# Patient Record
Sex: FEMALE | Race: ASIAN | NOT HISPANIC OR LATINO
[De-identification: names, ages, dates, MRNs, and addresses within clinical notes are randomized per-mention and may not be internally consistent; named-entity substitution may affect disease eponyms.]

---

## 2020-01-01 ENCOUNTER — RESULT REVIEW (OUTPATIENT)
Age: 55
End: 2020-01-01

## 2020-12-29 ENCOUNTER — RESULT REVIEW (OUTPATIENT)
Age: 55
End: 2020-12-29

## 2021-02-16 ENCOUNTER — RESULT REVIEW (OUTPATIENT)
Age: 56
End: 2021-02-16

## 2022-07-26 ENCOUNTER — RESULT REVIEW (OUTPATIENT)
Age: 57
End: 2022-07-26

## 2022-09-13 ENCOUNTER — RESULT REVIEW (OUTPATIENT)
Age: 57
End: 2022-09-13

## 2022-10-17 ENCOUNTER — RESULT REVIEW (OUTPATIENT)
Age: 57
End: 2022-10-17

## 2023-01-23 ENCOUNTER — INPATIENT (INPATIENT)
Facility: HOSPITAL | Age: 58
LOS: 1 days | Discharge: HOME | End: 2023-01-25
Attending: INTERNAL MEDICINE | Admitting: INTERNAL MEDICINE
Payer: COMMERCIAL

## 2023-01-23 VITALS
RESPIRATION RATE: 18 BRPM | TEMPERATURE: 98 F | SYSTOLIC BLOOD PRESSURE: 187 MMHG | HEART RATE: 93 BPM | DIASTOLIC BLOOD PRESSURE: 78 MMHG | OXYGEN SATURATION: 100 %

## 2023-01-23 DIAGNOSIS — E05.90 THYROTOXICOSIS, UNSPECIFIED WITHOUT THYROTOXIC CRISIS OR STORM: ICD-10-CM

## 2023-01-23 PROCEDURE — 99285 EMERGENCY DEPT VISIT HI MDM: CPT

## 2023-01-23 NOTE — ED ADULT TRIAGE NOTE - CHIEF COMPLAINT QUOTE
Pt states she had blood work done with PCP and was told to go to an endocrinologist for her thyroid. Denies a known thyroid problem. C/o 1 month of intermittent palpitations and feeling "shaky". Denies SOB, denies chest pain. Pt states she's unable to get an appt with endocrinologist until May.

## 2023-01-23 NOTE — ED PROVIDER NOTE - OBJECTIVE STATEMENT
57-year-old female with past medical history of  hyperlipidemia and diabetes.  Patient is on metformin and Farxiga.  Patient presents to the ED with 1 month of change in voice, weight loss, tremors and intermittent palpitations.  Patient denies any chest pain or shortness of breath.  Patient was seen by PMD few days ago and had blood work which showed low TSH of less than 0.01 with T3 total 388 and free T4 5.1.  Patient attempted to get follow-up with endocrine but was only able to get appointment in May.  Patient also notes change in voice.  Patient denies any new medications but did start krill oil after symptoms started.

## 2023-01-23 NOTE — ED PROVIDER NOTE - PHYSICAL EXAMINATION
Heart rate 98.  Asymmetry of left neck with enlargement on left side with nontender nodule noted on the left thyroid./Goiter.  Mild resting tremor of hands noted

## 2023-01-23 NOTE — CHART NOTE - NSCHARTNOTEFT_GEN_A_CORE
Pt is a 56 y/o female presenting with palpitations to the ED. Also with weight loss and change in voice. Referred by PCP as outpatient TFTs suggestive of thyrotoxicosis. TSH undetectable, TT3 388, FT4 5.1 per ED attending. HR upper 90s, low 100. Has an asymmetric goiter, most prominent on left. No evidence of graves eye disease. Mental status normal and patient does not appear to be in thyroid storm per attending. Patient would be willing to stay overnight for observation or admission if no CDU beds available. Would start low dose propranolol as BP tolerates with goal HR 60-80. Would start methimazole 20mg daily, first dose now. Explain to patient risks of methimazole including agranulocytosis and hepatic dysfunction. Per ED attending, outpatient labs done this past Thursday showed normal WBC and mild aminotransferase elevations (~2x normal). Send off TSH receptor antibody (TRAb), thyroid stimulating immunoglobulin (TSI), TSH, FT4 and total T3 in the morning.    Full consult on 1/24.    Discussed with ED attending Dr. Olvera.    Tommy Carlton DO, Endocrinology Fellow  For follow-up questions, discharge recommendations, or new consults please call answering service at [phone] (weekdays), [phone] (nights/weekends). For nonurgent matters, please email [email or web address] or [email or web address].

## 2023-01-23 NOTE — ED PROVIDER NOTE - NSICDXPASTMEDICALHX_GEN_ALL_CORE_FT
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

## 2023-01-23 NOTE — ED PROVIDER NOTE - CLINICAL SUMMARY MEDICAL DECISION MAKING FREE TEXT BOX
57-year-old female with diabetes and hyperlipidemia.  Patient presents to the ED with symptoms of hyperthyroidism.  On physical exam patient is noted to have nodule or goiter on left side which is asymmetric along with symptomatic hyperthyroidism.  Case and labs discussed with endocrine consult and they recommended starting patient on propranolol which has been ordered.  We will also start methimazole once LFTs are checked.  Will order labs as requested and admit patient to medical service for observation as there are no CDU beds available at this time.  Patient may get ultrasound of the thyroid in the a.m.  Patient is a good candidate for admission given significant elevation in T3 and T4 versus outpatient follow-up

## 2023-01-24 ENCOUNTER — TRANSCRIPTION ENCOUNTER (OUTPATIENT)
Age: 58
End: 2023-01-24

## 2023-01-24 DIAGNOSIS — E78.5 HYPERLIPIDEMIA, UNSPECIFIED: ICD-10-CM

## 2023-01-24 DIAGNOSIS — E05.90 THYROTOXICOSIS, UNSPECIFIED WITHOUT THYROTOXIC CRISIS OR STORM: ICD-10-CM

## 2023-01-24 DIAGNOSIS — I10 ESSENTIAL (PRIMARY) HYPERTENSION: ICD-10-CM

## 2023-01-24 DIAGNOSIS — E11.9 TYPE 2 DIABETES MELLITUS WITHOUT COMPLICATIONS: ICD-10-CM

## 2023-01-24 DIAGNOSIS — R63.8 OTHER SYMPTOMS AND SIGNS CONCERNING FOOD AND FLUID INTAKE: ICD-10-CM

## 2023-01-24 LAB
A1C WITH ESTIMATED AVERAGE GLUCOSE RESULT: 7.4 % — HIGH (ref 4–5.6)
ALBUMIN SERPL ELPH-MCNC: 3.6 G/DL — SIGNIFICANT CHANGE UP (ref 3.3–5)
ALBUMIN SERPL ELPH-MCNC: 4.2 G/DL — SIGNIFICANT CHANGE UP (ref 3.3–5)
ALP SERPL-CCNC: 60 U/L — SIGNIFICANT CHANGE UP (ref 40–120)
ALP SERPL-CCNC: 68 U/L — SIGNIFICANT CHANGE UP (ref 40–120)
ALT FLD-CCNC: 52 U/L — HIGH (ref 4–33)
ALT FLD-CCNC: 66 U/L — HIGH (ref 4–33)
ANION GAP SERPL CALC-SCNC: 12 MMOL/L — SIGNIFICANT CHANGE UP (ref 7–14)
ANION GAP SERPL CALC-SCNC: 16 MMOL/L — HIGH (ref 7–14)
AST SERPL-CCNC: 24 U/L — SIGNIFICANT CHANGE UP (ref 4–32)
AST SERPL-CCNC: 30 U/L — SIGNIFICANT CHANGE UP (ref 4–32)
BASOPHILS # BLD AUTO: 0.02 K/UL — SIGNIFICANT CHANGE UP (ref 0–0.2)
BASOPHILS # BLD AUTO: 0.02 K/UL — SIGNIFICANT CHANGE UP (ref 0–0.2)
BASOPHILS NFR BLD AUTO: 0.1 % — SIGNIFICANT CHANGE UP (ref 0–2)
BASOPHILS NFR BLD AUTO: 0.2 % — SIGNIFICANT CHANGE UP (ref 0–2)
BILIRUB SERPL-MCNC: 0.4 MG/DL — SIGNIFICANT CHANGE UP (ref 0.2–1.2)
BILIRUB SERPL-MCNC: 0.5 MG/DL — SIGNIFICANT CHANGE UP (ref 0.2–1.2)
BUN SERPL-MCNC: 26 MG/DL — HIGH (ref 7–23)
BUN SERPL-MCNC: 27 MG/DL — HIGH (ref 7–23)
CALCIUM SERPL-MCNC: 10.1 MG/DL — SIGNIFICANT CHANGE UP (ref 8.4–10.5)
CALCIUM SERPL-MCNC: 9.7 MG/DL — SIGNIFICANT CHANGE UP (ref 8.4–10.5)
CHLORIDE SERPL-SCNC: 103 MMOL/L — SIGNIFICANT CHANGE UP (ref 98–107)
CHLORIDE SERPL-SCNC: 105 MMOL/L — SIGNIFICANT CHANGE UP (ref 98–107)
CO2 SERPL-SCNC: 22 MMOL/L — SIGNIFICANT CHANGE UP (ref 22–31)
CO2 SERPL-SCNC: 25 MMOL/L — SIGNIFICANT CHANGE UP (ref 22–31)
CREAT SERPL-MCNC: 0.45 MG/DL — LOW (ref 0.5–1.3)
CREAT SERPL-MCNC: 0.56 MG/DL — SIGNIFICANT CHANGE UP (ref 0.5–1.3)
EGFR: 107 ML/MIN/1.73M2 — SIGNIFICANT CHANGE UP
EGFR: 113 ML/MIN/1.73M2 — SIGNIFICANT CHANGE UP
EOSINOPHIL # BLD AUTO: 0.04 K/UL — SIGNIFICANT CHANGE UP (ref 0–0.5)
EOSINOPHIL # BLD AUTO: 0.06 K/UL — SIGNIFICANT CHANGE UP (ref 0–0.5)
EOSINOPHIL NFR BLD AUTO: 0.2 % — SIGNIFICANT CHANGE UP (ref 0–6)
EOSINOPHIL NFR BLD AUTO: 0.7 % — SIGNIFICANT CHANGE UP (ref 0–6)
ESTIMATED AVERAGE GLUCOSE: 166 — SIGNIFICANT CHANGE UP
GLUCOSE BLDC GLUCOMTR-MCNC: 116 MG/DL — HIGH (ref 70–99)
GLUCOSE BLDC GLUCOMTR-MCNC: 125 MG/DL — HIGH (ref 70–99)
GLUCOSE BLDC GLUCOMTR-MCNC: 134 MG/DL — HIGH (ref 70–99)
GLUCOSE BLDC GLUCOMTR-MCNC: 143 MG/DL — HIGH (ref 70–99)
GLUCOSE BLDC GLUCOMTR-MCNC: 98 MG/DL — SIGNIFICANT CHANGE UP (ref 70–99)
GLUCOSE SERPL-MCNC: 110 MG/DL — HIGH (ref 70–99)
GLUCOSE SERPL-MCNC: 179 MG/DL — HIGH (ref 70–99)
HCT VFR BLD CALC: 35.9 % — SIGNIFICANT CHANGE UP (ref 34.5–45)
HCT VFR BLD CALC: 40.8 % — SIGNIFICANT CHANGE UP (ref 34.5–45)
HGB BLD-MCNC: 11.6 G/DL — SIGNIFICANT CHANGE UP (ref 11.5–15.5)
HGB BLD-MCNC: 13.1 G/DL — SIGNIFICANT CHANGE UP (ref 11.5–15.5)
IANC: 13 K/UL — HIGH (ref 1.8–7.4)
IANC: 4.65 K/UL — SIGNIFICANT CHANGE UP (ref 1.8–7.4)
IMM GRANULOCYTES NFR BLD AUTO: 0.4 % — SIGNIFICANT CHANGE UP (ref 0–0.9)
IMM GRANULOCYTES NFR BLD AUTO: 0.4 % — SIGNIFICANT CHANGE UP (ref 0–0.9)
LYMPHOCYTES # BLD AUTO: 14.6 % — SIGNIFICANT CHANGE UP (ref 13–44)
LYMPHOCYTES # BLD AUTO: 2.38 K/UL — SIGNIFICANT CHANGE UP (ref 1–3.3)
LYMPHOCYTES # BLD AUTO: 3.74 K/UL — HIGH (ref 1–3.3)
LYMPHOCYTES # BLD AUTO: 41 % — SIGNIFICANT CHANGE UP (ref 13–44)
MAGNESIUM SERPL-MCNC: 2 MG/DL — SIGNIFICANT CHANGE UP (ref 1.6–2.6)
MCHC RBC-ENTMCNC: 27.1 PG — SIGNIFICANT CHANGE UP (ref 27–34)
MCHC RBC-ENTMCNC: 27.3 PG — SIGNIFICANT CHANGE UP (ref 27–34)
MCHC RBC-ENTMCNC: 32.1 GM/DL — SIGNIFICANT CHANGE UP (ref 32–36)
MCHC RBC-ENTMCNC: 32.3 GM/DL — SIGNIFICANT CHANGE UP (ref 32–36)
MCV RBC AUTO: 84.3 FL — SIGNIFICANT CHANGE UP (ref 80–100)
MCV RBC AUTO: 84.5 FL — SIGNIFICANT CHANGE UP (ref 80–100)
MONOCYTES # BLD AUTO: 0.61 K/UL — SIGNIFICANT CHANGE UP (ref 0–0.9)
MONOCYTES # BLD AUTO: 0.77 K/UL — SIGNIFICANT CHANGE UP (ref 0–0.9)
MONOCYTES NFR BLD AUTO: 4.7 % — SIGNIFICANT CHANGE UP (ref 2–14)
MONOCYTES NFR BLD AUTO: 6.7 % — SIGNIFICANT CHANGE UP (ref 2–14)
NEUTROPHILS # BLD AUTO: 13 K/UL — HIGH (ref 1.8–7.4)
NEUTROPHILS # BLD AUTO: 4.65 K/UL — SIGNIFICANT CHANGE UP (ref 1.8–7.4)
NEUTROPHILS NFR BLD AUTO: 51 % — SIGNIFICANT CHANGE UP (ref 43–77)
NEUTROPHILS NFR BLD AUTO: 80 % — HIGH (ref 43–77)
NRBC # BLD: 0 /100 WBCS — SIGNIFICANT CHANGE UP (ref 0–0)
NRBC # BLD: 0 /100 WBCS — SIGNIFICANT CHANGE UP (ref 0–0)
NRBC # FLD: 0 K/UL — SIGNIFICANT CHANGE UP (ref 0–0)
NRBC # FLD: 0 K/UL — SIGNIFICANT CHANGE UP (ref 0–0)
PHOSPHATE SERPL-MCNC: 5.8 MG/DL — HIGH (ref 2.5–4.5)
PLATELET # BLD AUTO: 235 K/UL — SIGNIFICANT CHANGE UP (ref 150–400)
PLATELET # BLD AUTO: 270 K/UL — SIGNIFICANT CHANGE UP (ref 150–400)
POTASSIUM SERPL-MCNC: 3.7 MMOL/L — SIGNIFICANT CHANGE UP (ref 3.5–5.3)
POTASSIUM SERPL-MCNC: 4.4 MMOL/L — SIGNIFICANT CHANGE UP (ref 3.5–5.3)
POTASSIUM SERPL-SCNC: 3.7 MMOL/L — SIGNIFICANT CHANGE UP (ref 3.5–5.3)
POTASSIUM SERPL-SCNC: 4.4 MMOL/L — SIGNIFICANT CHANGE UP (ref 3.5–5.3)
PROT SERPL-MCNC: 6.5 G/DL — SIGNIFICANT CHANGE UP (ref 6–8.3)
PROT SERPL-MCNC: 7.4 G/DL — SIGNIFICANT CHANGE UP (ref 6–8.3)
RBC # BLD: 4.25 M/UL — SIGNIFICANT CHANGE UP (ref 3.8–5.2)
RBC # BLD: 4.84 M/UL — SIGNIFICANT CHANGE UP (ref 3.8–5.2)
RBC # FLD: 12.3 % — SIGNIFICANT CHANGE UP (ref 10.3–14.5)
RBC # FLD: 12.4 % — SIGNIFICANT CHANGE UP (ref 10.3–14.5)
SARS-COV-2 RNA SPEC QL NAA+PROBE: SIGNIFICANT CHANGE UP
SODIUM SERPL-SCNC: 140 MMOL/L — SIGNIFICANT CHANGE UP (ref 135–145)
SODIUM SERPL-SCNC: 143 MMOL/L — SIGNIFICANT CHANGE UP (ref 135–145)
T3 SERPL-MCNC: 329 NG/DL — HIGH (ref 80–200)
T4 AB SER-ACNC: 18.49 UG/DL — HIGH (ref 5.1–13)
THYROGLOB AB FLD IA-ACNC: <20 IU/ML — SIGNIFICANT CHANGE UP
THYROGLOB AB SERPL-ACNC: <20 IU/ML — SIGNIFICANT CHANGE UP
THYROGLOB SERPL-MCNC: 247 NG/ML — HIGH (ref 1.6–59.9)
TSH SERPL-MCNC: <0.1 UIU/ML — LOW (ref 0.27–4.2)
TSH SERPL-MCNC: <0.1 UIU/ML — LOW (ref 0.27–4.2)
WBC # BLD: 16.27 K/UL — HIGH (ref 3.8–10.5)
WBC # BLD: 9.12 K/UL — SIGNIFICANT CHANGE UP (ref 3.8–10.5)
WBC # FLD AUTO: 16.27 K/UL — HIGH (ref 3.8–10.5)
WBC # FLD AUTO: 9.12 K/UL — SIGNIFICANT CHANGE UP (ref 3.8–10.5)

## 2023-01-24 PROCEDURE — 99255 IP/OBS CONSLTJ NEW/EST HI 80: CPT | Mod: GC

## 2023-01-24 PROCEDURE — 99223 1ST HOSP IP/OBS HIGH 75: CPT

## 2023-01-24 RX ORDER — SODIUM CHLORIDE 9 MG/ML
1000 INJECTION, SOLUTION INTRAVENOUS
Refills: 0 | Status: DISCONTINUED | OUTPATIENT
Start: 2023-01-24 | End: 2023-01-25

## 2023-01-24 RX ORDER — METFORMIN HYDROCHLORIDE 850 MG/1
2 TABLET ORAL
Qty: 120 | Refills: 0
Start: 2023-01-24 | End: 2023-02-22

## 2023-01-24 RX ORDER — METFORMIN HYDROCHLORIDE 850 MG/1
1 TABLET ORAL
Qty: 0 | Refills: 0 | DISCHARGE

## 2023-01-24 RX ORDER — ENOXAPARIN SODIUM 100 MG/ML
40 INJECTION SUBCUTANEOUS EVERY 24 HOURS
Refills: 0 | Status: DISCONTINUED | OUTPATIENT
Start: 2023-01-24 | End: 2023-01-25

## 2023-01-24 RX ORDER — ATENOLOL 25 MG/1
25 TABLET ORAL DAILY
Refills: 0 | Status: DISCONTINUED | OUTPATIENT
Start: 2023-01-24 | End: 2023-01-25

## 2023-01-24 RX ORDER — DEXTROSE 50 % IN WATER 50 %
25 SYRINGE (ML) INTRAVENOUS ONCE
Refills: 0 | Status: DISCONTINUED | OUTPATIENT
Start: 2023-01-24 | End: 2023-01-25

## 2023-01-24 RX ORDER — DEXTROSE 50 % IN WATER 50 %
12.5 SYRINGE (ML) INTRAVENOUS ONCE
Refills: 0 | Status: DISCONTINUED | OUTPATIENT
Start: 2023-01-24 | End: 2023-01-25

## 2023-01-24 RX ORDER — DEXTROSE 50 % IN WATER 50 %
15 SYRINGE (ML) INTRAVENOUS ONCE
Refills: 0 | Status: DISCONTINUED | OUTPATIENT
Start: 2023-01-24 | End: 2023-01-25

## 2023-01-24 RX ORDER — INSULIN LISPRO 100/ML
VIAL (ML) SUBCUTANEOUS AT BEDTIME
Refills: 0 | Status: DISCONTINUED | OUTPATIENT
Start: 2023-01-24 | End: 2023-01-25

## 2023-01-24 RX ORDER — GLUCAGON INJECTION, SOLUTION 0.5 MG/.1ML
1 INJECTION, SOLUTION SUBCUTANEOUS ONCE
Refills: 0 | Status: DISCONTINUED | OUTPATIENT
Start: 2023-01-24 | End: 2023-01-25

## 2023-01-24 RX ORDER — ATENOLOL 25 MG/1
1 TABLET ORAL
Qty: 30 | Refills: 0
Start: 2023-01-24 | End: 2023-02-22

## 2023-01-24 RX ORDER — ACETAMINOPHEN 500 MG
650 TABLET ORAL EVERY 6 HOURS
Refills: 0 | Status: DISCONTINUED | OUTPATIENT
Start: 2023-01-24 | End: 2023-01-25

## 2023-01-24 RX ORDER — INSULIN LISPRO 100/ML
VIAL (ML) SUBCUTANEOUS
Refills: 0 | Status: DISCONTINUED | OUTPATIENT
Start: 2023-01-24 | End: 2023-01-25

## 2023-01-24 RX ORDER — ATORVASTATIN CALCIUM 80 MG/1
40 TABLET, FILM COATED ORAL AT BEDTIME
Refills: 0 | Status: DISCONTINUED | OUTPATIENT
Start: 2023-01-24 | End: 2023-01-25

## 2023-01-24 RX ORDER — METHIMAZOLE 10 MG/1
2 TABLET ORAL
Qty: 60 | Refills: 0
Start: 2023-01-24 | End: 2023-02-22

## 2023-01-24 RX ADMIN — ATORVASTATIN CALCIUM 40 MILLIGRAM(S): 80 TABLET, FILM COATED ORAL at 22:37

## 2023-01-24 RX ADMIN — ATENOLOL 25 MILLIGRAM(S): 25 TABLET ORAL at 19:50

## 2023-01-24 NOTE — ED ADULT NURSE NOTE - OBJECTIVE STATEMENT
Pt is A&O x 4, ambulatory w/o assistance, shows no signs of acute distress. Sent to the ED for for abnormal labs. Pt states for 2 months she has been experiencing intermittent palpitations and a generalized "shaky" feeling. Pt also endorses a lump on the left side of her throat and a 20 pound unintentional weight loss over the last 2 months. Currently denies gi/gu discomfort, SOB, or chest pain. Respirations even and unlabored. Left forearm 20 gauge placed and labs drawn. Medications administered. Pending results.

## 2023-01-24 NOTE — CONSULT NOTE ADULT - SUBJECTIVE AND OBJECTIVE BOX
HPI:  Patient is a 57 year old F hx of HLD, DM2 , and recently diagnosed hyperthyroidism who presents for follow up. She has had 1 month weight loss and intermittent palpitations. She denies chest pain or sob. She was seen by PCP that showed low TSH 0.01, T3 388, free t4 5.1. ED course HR 90s, given propranolol total 20 mg. Endocrine was consulted in ED.     ED vitals: afebrile, HR 90s, /50, RR 18 98% RA (2023 00:58)      Consulted for: thyrotoxicosis  This is a 58 yo F /w a PMH of DM2 and HLD who presents to ED after PCP recommended evaluation for palpitations when her lab work showed evidence of hyperthyroidism  Per patient she noticed over the last 3 months heat intolerance, palpitations, diaphoresis, tremors in her hands, and a 20 pound unintentional weight loss. Denies any hyperdefecation, vision changes. Reports swelling in the neck and reports feeling a nodule in her left thyroid. Denies dysphonia, dysphagia, or shortness of breath. Denies any history of lithium, amiodarone, or biotin use. Denies a family history of hypothyroidism, hyperthyroidism, or thyroid cancer    Patient reports DM2 for 15 years. Checks her FSG in the morning and ranges around 130. HbA1c 7.4%. Takes metformin 500mg twice per day only. She is agreeable to increase the dose to 1000mg twice per day    Endocrine on call paged last night and started patient on metihmazole 20mg daily and propranolol 20mg daily.    PAST MEDICAL & SURGICAL HISTORY:  h/o uterine leiomyoma      ovarian Cyst - left ovary      hyperlipidemia      Eye Abnormality--right lazy eye--intermittently deviates medially      Hepatitis A---&#x27;from food&#x27; -- 1985      Eye Abnormality--left eye muscle doesn&#x27;t accommodate to the left      High blood sugar  high blood sugar borderline DM per pt never on meds for blood sugar       Delivery       H/O: myomectomy in 2011      High blood sugar          FAMILY HISTORY:  No significant family history (Father)        Social History: denies all toxic habits    Home Medications:  Farxiga 5 mg oral tablet: 1 tab(s) orally once a day (2023 01:42)  Lipitor 40 mg oral tablet: 1 tab(s) orally once a day (2023 01:42)  metFORMIN 500 mg oral tablet: 1 tab(s) orally 2 times a day (2023 01:42)      MEDICATIONS  (STANDING):  atorvastatin 40 milliGRAM(s) Oral at bedtime  dextrose 5%. 1000 milliLiter(s) (100 mL/Hr) IV Continuous <Continuous>  dextrose 5%. 1000 milliLiter(s) (50 mL/Hr) IV Continuous <Continuous>  dextrose 50% Injectable 25 Gram(s) IV Push once  dextrose 50% Injectable 12.5 Gram(s) IV Push once  dextrose 50% Injectable 25 Gram(s) IV Push once  enoxaparin Injectable 40 milliGRAM(s) SubCutaneous every 24 hours  glucagon  Injectable 1 milliGRAM(s) IntraMuscular once  insulin lispro (ADMELOG) corrective regimen sliding scale   SubCutaneous three times a day before meals  insulin lispro (ADMELOG) corrective regimen sliding scale   SubCutaneous at bedtime  methimazole 20 milliGRAM(s) Oral every 24 hours  propranolol 20 milliGRAM(s) Oral every 24 hours    MEDICATIONS  (PRN):  acetaminophen     Tablet .. 650 milliGRAM(s) Oral every 6 hours PRN Temp greater or equal to 38C (100.4F), Mild Pain (1 - 3)  dextrose Oral Gel 15 Gram(s) Oral once PRN Blood Glucose LESS THAN 70 milliGRAM(s)/deciliter      Allergies    bactrim/sulfamethoxazole---pruritc rash with edema (Other)  sulfa drugs (Unknown)  trimethoprim (Unknown)    Intolerances      Review of Systems:  Constitutional: No fever  Eyes: No blurry vision  Neuro: No tremors  HEENT: No pain  Cardiovascular: No chest pain, palpitations  Respiratory: No SOB, no cough  GI: No nausea, vomiting, abdominal pain  : No dysuria  Skin: no rash  Psych: no depression  Endocrine: no polyuria, polydipsia  Hem/lymph: no swelling  Osteoporosis: no fractures    PHYSICAL EXAM:  VITALS: T(C): 36.8 (23 @ 10:05)  T(F): 98.3 (23 @ 10:05), Max: 98.5 (23 @ 19:20)  HR: 63 (23 @ 10:05) (63 - 98)  BP: 101/50 (23 @ 10:05) (101/50 - 187/78)  RR:  (17 - 19)  SpO2:  (97% - 100%)  Wt(kg): --  GENERAL: NAD  EYES: No proptosis, no lid lag, anicteric  THYROID: there is a large nodule palpated on the left thyroid ~2cm in size. There is a smaller nodule palpated on the right thyroid (unable to assess size)  RESPIRATORY: Clear to auscultation bilaterally  CARDIOVASCULAR: Regular rate and rhythm  GI: Soft, nontender, non distended  EXT: b/l feet without wounds; 2+ pulses  PSYCH: Alert and oriented x 3, reactive mood    POCT Blood Glucose.: 125 mg/dL (23 @ 12:55)  POCT Blood Glucose.: 98 mg/dL (23 @ 08:55)  POCT Blood Glucose.: 134 mg/dL (23 @ 02:28)                            11.6   9.12  )-----------( 235      ( 2023 06:20 )             35.9           143  |  105  |  26<H>  ----------------------------<  110<H>  3.7   |  22  |  0.45<L>    eGFR: 113    Ca    9.7        Mg     2.00       Phos  5.8         TPro  6.5  /  Alb  3.6  /  TBili  0.4  /  DBili  x   /  AST  24  /  ALT  52<H>  /  AlkPhos  60        Thyroid Function Tests:   @ 23:39 TSH <0.10 FreeT4 -- T3 329 Anti TPO -- Anti Thyroglobulin Ab -- TSI --      A1C with Estimated Average Glucose Result: 7.4 % (23 @ 23:39)          Radiology:                HPI:  Patient is a 57 year old F hx of HLD, DM2 , and recently diagnosed hyperthyroidism who presents for management. She has had 1 month weight loss and intermittent palpitations. She denies chest pain or sob. She was seen by PCP that showed low TSH 0.01, T3 388, free t4 5.1. ED course HR 90s, given propranolol total 20 mg. Endocrine was consulted in ED.     ED vitals: afebrile, HR 90s, /50, RR 18 98% RA (2023 00:58)      Consulted for: thyrotoxicosis  This is a 56 yo F /w a PMH of DM2 and HLD who presents to ED after PCP recommended evaluation for palpitations when her lab work showed evidence of hyperthyroidism  Per patient she noticed over the last 3 months heat intolerance, palpitations, diaphoresis, tremors in her hands, and a 20 pound unintentional weight loss. Denies any hyperdefecation, vision changes. Reports swelling in the neck and reports feeling a nodule in her left thyroid. Denies dysphonia, dysphagia, or shortness of breath. Denies any history of lithium, amiodarone, or biotin use. Denies a family history of hypothyroidism, hyperthyroidism, or thyroid cancer    Patient reports DM2 for 15 years. Checks her FSG in the morning and ranges around 130. HbA1c 7.4%. Takes metformin 500mg twice per day only. She is agreeable to increase the dose to 1000mg twice per day    Endocrine on call paged last night and started patient on metihmazole 20mg daily and propranolol 20mg daily.    PAST MEDICAL & SURGICAL HISTORY:  h/o uterine leiomyoma      ovarian Cyst - left ovary      hyperlipidemia      Eye Abnormality--right lazy eye--intermittently deviates medially      Hepatitis A---&#x27;from food&#x27; -- 1985      Eye Abnormality--left eye muscle doesn&#x27;t accommodate to the left      High blood sugar  high blood sugar borderline DM per pt never on meds for blood sugar       Delivery       H/O: myomectomy in 2011      High blood sugar          FAMILY HISTORY:  No significant family history (Father)        Social History: denies all toxic habits    Home Medications:  Farxiga 5 mg oral tablet: 1 tab(s) orally once a day (2023 01:42)  Lipitor 40 mg oral tablet: 1 tab(s) orally once a day (2023 01:42)  metFORMIN 500 mg oral tablet: 1 tab(s) orally 2 times a day (2023 01:42)      MEDICATIONS  (STANDING):  atorvastatin 40 milliGRAM(s) Oral at bedtime  dextrose 5%. 1000 milliLiter(s) (100 mL/Hr) IV Continuous <Continuous>  dextrose 5%. 1000 milliLiter(s) (50 mL/Hr) IV Continuous <Continuous>  dextrose 50% Injectable 25 Gram(s) IV Push once  dextrose 50% Injectable 12.5 Gram(s) IV Push once  dextrose 50% Injectable 25 Gram(s) IV Push once  enoxaparin Injectable 40 milliGRAM(s) SubCutaneous every 24 hours  glucagon  Injectable 1 milliGRAM(s) IntraMuscular once  insulin lispro (ADMELOG) corrective regimen sliding scale   SubCutaneous three times a day before meals  insulin lispro (ADMELOG) corrective regimen sliding scale   SubCutaneous at bedtime  methimazole 20 milliGRAM(s) Oral every 24 hours  propranolol 20 milliGRAM(s) Oral every 24 hours    MEDICATIONS  (PRN):  acetaminophen     Tablet .. 650 milliGRAM(s) Oral every 6 hours PRN Temp greater or equal to 38C (100.4F), Mild Pain (1 - 3)  dextrose Oral Gel 15 Gram(s) Oral once PRN Blood Glucose LESS THAN 70 milliGRAM(s)/deciliter      Allergies    bactrim/sulfamethoxazole---pruritc rash with edema (Other)  sulfa drugs (Unknown)  trimethoprim (Unknown)    Intolerances      Review of Systems:  Constitutional: No fever  Eyes: No blurry vision  Neuro: No tremors  HEENT: No pain  Cardiovascular: No chest pain, palpitations  Respiratory: No SOB, no cough  GI: No nausea, vomiting, abdominal pain  : No dysuria  Skin: no rash  Psych: no depression  Endocrine: no polyuria, polydipsia  Hem/lymph: no swelling  Osteoporosis: no fractures    PHYSICAL EXAM:  VITALS: T(C): 36.8 (23 @ 10:05)  T(F): 98.3 (23 @ 10:05), Max: 98.5 (23 @ 19:20)  HR: 63 (23 @ 10:05) (63 - 98)  BP: 101/50 (23 @ 10:05) (101/50 - 187/78)  RR:  (17 - 19)  SpO2:  (97% - 100%)  Wt(kg): --  GENERAL: NAD  EYES: No proptosis, no lid lag, anicteric  THYROID: there is a large nodule palpated on the left thyroid ~2cm in size. There is a smaller nodule palpated on the right thyroid (unable to assess size)  RESPIRATORY: Clear to auscultation bilaterally  CARDIOVASCULAR: Regular rate and rhythm  GI: Soft, nontender, non distended  EXT: b/l feet without wounds; 2+ pulses  PSYCH: Alert and oriented x 3, reactive mood    POCT Blood Glucose.: 125 mg/dL (23 @ 12:55)  POCT Blood Glucose.: 98 mg/dL (23 @ 08:55)  POCT Blood Glucose.: 134 mg/dL (23 @ 02:28)                            11.6   9.12  )-----------( 235      ( 2023 06:20 )             35.9           143  |  105  |  26<H>  ----------------------------<  110<H>  3.7   |  22  |  0.45<L>    eGFR: 113    Ca    9.7        Mg     2.00       Phos  5.8         TPro  6.5  /  Alb  3.6  /  TBili  0.4  /  DBili  x   /  AST  24  /  ALT  52<H>  /  AlkPhos  60        Thyroid Function Tests:   @ 23:39 TSH <0.10 FreeT4 -- T3 329 Anti TPO -- Anti Thyroglobulin Ab -- TSI --      A1C with Estimated Average Glucose Result: 7.4 % (23 @ 23:39)          Radiology:

## 2023-01-24 NOTE — H&P ADULT - PROBLEM SELECTOR PLAN 1
-check TRab, TSI and f/u TSH and t4/t3  -methimazole 20 mg daily  -propranolol 20 mg and uptitrate as tolerated

## 2023-01-24 NOTE — H&P ADULT - ASSESSMENT
Patient is a 57 year old F hx of HLD, DM2 , and recently diagnosed hyperthyroidism who presents for follow up.

## 2023-01-24 NOTE — DISCHARGE NOTE PROVIDER - NSDCFUSCHEDAPPT_GEN_ALL_CORE_FT
Del Preston  [facility]  ENDOCRIN [address]  Scheduled Appointment: 04/07/2023

## 2023-01-24 NOTE — PATIENT PROFILE ADULT - FUNCTIONAL ASSESSMENT - DAILY ACTIVITY 2.
4 = No assist / stand by assistance Bactrim Counseling:  I discussed with the patient the risks of sulfa antibiotics including but not limited to GI upset, allergic reaction, drug rash, diarrhea, dizziness, photosensitivity, and yeast infections.  Rarely, more serious reactions can occur including but not limited to aplastic anemia, agranulocytosis, methemoglobinemia, blood dyscrasias, liver or kidney failure, lung infiltrates or desquamative/blistering drug rashes.

## 2023-01-24 NOTE — DISCHARGE NOTE PROVIDER - PROVIDER TOKENS
FREE:[LAST:[Endocrinology],PHONE:[(   )    -],FAX:[(   )    -]],PROVIDER:[TOKEN:[[id number]]] PROVIDER:[TOKEN:[[id number]]],FREE:[LAST:[Endocrinology],PHONE:[(   )    -],FAX:[(   )    -]],PROVIDER:[TOKEN:[[id number]]]

## 2023-01-24 NOTE — DISCHARGE NOTE PROVIDER - HOSPITAL COURSE
Hyperthyroidism.   -check TRab, TSI and f/u TSH and t4/t3- can be followed up as outpatient  -methimazole 20 mg daily  -atenolol 25mg PO daily  -outpatient Endocrine follow up  -follow up details given to patient by endocrinology    HLD (hyperlipidemia).   -c/w lipitor 40 mg    DM2 (diabetes mellitus, type 2).   -CC diet  -moderate sliding scale.  hgbA1c- 7.4  -per endo, inc dose of metformin on dc to 1000mg PO BID    Dispo- home     Hyperthyroidism.   -check TRab, TSI and f/u TSH and t4/t3- can be followed up as outpatient  -methimazole 20 mg daily  -atenolol 25mg PO daily  -outpatient Endocrine follow up  -follow up details given to patient by endocrinology    HLD (hyperlipidemia).   -c/w lipitor 40 mg    DM2 (diabetes mellitus, type 2).   -CC diet  -moderate sliding scale.  hgbA1c- 7.4  -per endo, inc dose of metformin on dc to 1000mg PO BID    Dispo- home    As discussed with Dr. Seals on 1/25/23 pt is medically stable and cleared for discharge home.

## 2023-01-24 NOTE — DISCHARGE NOTE PROVIDER - NSDCCPCAREPLAN_GEN_ALL_CORE_FT
PRINCIPAL DISCHARGE DIAGNOSIS  Diagnosis: Hyperthyroidism  Assessment and Plan of Treatment: Continue Methimazole and Atenolol as prescribed. Follow up with endocrinology as instructed by endocrine team. You were given a list of doctors for follow up in your area. Return to ED for persistent palpitations, chest pain or shortness of breath      SECONDARY DISCHARGE DIAGNOSES  Diagnosis: DM2 (diabetes mellitus, type 2)  Assessment and Plan of Treatment: Your hgbA1c is 7.4. Continue Metformin at increase dose of 1000mg 2x/day. Continue Farxiga as previously prescribed. FOllow up with endocrinology for further management.     PRINCIPAL DISCHARGE DIAGNOSIS  Diagnosis: Hyperthyroidism  Assessment and Plan of Treatment: Continue Methimazole and Atenolol as prescribed. Follow up with endocrinology as instructed by endocrine team. You were given a list of doctors for follow up in your area. Return to ED for persistent palpitations, chest pain or shortness of breath, dizziness/lightheadedness or feeling of fainting, or other new concerning symptom.  Inform your Primary Care Provider (PCP) of your admission and need for outpatient followup; have your primary care monitor you and your labwork until you can be seen by an Endocrinologist.  Your PCP can also arrange testing (ultrasound, possibly fine needle aspiration, of your thyroid nodule). Additional Endo recommendations who can hopefully see you sooner than your April appointment include:  call [phone]; There are community endocrine practices as well within [facility] you can call who may have earlier appts: Can call [phone] for Dr. Junior or his partners.      SECONDARY DISCHARGE DIAGNOSES  Diagnosis: DM2 (diabetes mellitus, type 2)  Assessment and Plan of Treatment: Your hgbA1c is 7.4. Continue Metformin at increase dose of 1000mg 2x/day. Continue Farxiga as previously prescribed. Follow up with endocrinology for further management. Your urine test today only showed high glucose (from your diabetes), no urine infection.    Eat a consistent carbohydrate diet (Meaning eating the same amount of carbohydrates at the same time each day). Monitor blood glucose levels throughout the day before meals and at bedtime. Record blood sugars and bring to outpatient providers appointment in order to be reviewed by your doctor for management modifications. If your sugars are more than 400 or less than 70 you should contact your PCP immediately.   Monitor for signs/symptoms of low blood glucose, such as, dizziness, altered mental status, or cool/clammy skin. In addition, monitor for signs/symptoms of high blood glucose, such as, feeling hot, dry, fatigued, or with increased thirst/urination.  Return to hospital if these symptoms occur/   Make regular podiatry appointments in order to have feet checked for wounds and uncontrolled toe nail growth to prevent infections, as well as, appointments with an ophthalmologist to monitor your vision.

## 2023-01-24 NOTE — CONSULT NOTE ADULT - ATTENDING COMMENTS
New onset hyperthyroidism - Graves disease vs. toxic nodule in differential.  Initiate methimazole and agree with beta blocker.  Vitals stable, can dc on medical management with close outpatient endocrine follow up for continued work up and management.  DM2 agree with plan as outlined. Complex patient high level decision making.    Gabriela Marti MD  Division of Endocrinology  Pager: [phone]    If after 6PM or before 9AM, or on weekends/holidays, please call endocrine answering service for assistance ([phone]).  For nonurgent matters email [email or web address] for assistance.

## 2023-01-24 NOTE — CONSULT NOTE ADULT - ASSESSMENT
This is a 58 yo F /w a PMH of DM2 and HLD who presents to ED with palpitations and evidence of thyrotoxicosis. Physical exam notable for HR in 70-90s and thyroid nodules palpated bilaterally L >R. Labs notable for TSH <0.01 and TT3 328, TT4 18, FT4 as outpatient 5.1. Differential diagnosis in this case is Graves' disease vs toxic multinodular goiter vs toxic adenoma. Will assess antibodies for evaluation for Graves'. Patient will likely need a thyroid US as outpatient as well as thyroid uptake and scan if antibodies are negative to evaluate for toxic multinodular goiter. Given high FT4 and signs and symptoms of hyperthyroidism, will continue methimazole and have patient follow up as an outpatient    #Hyperthyroidism   -TSI sent, can follow up as outpatient  -follow up FT4  -IF able to add on TSH receptor antibody, please do so prior to discharge. If staying overnight please send this in the morning  -unclear why IGF1 sent, no signs of acromegaly from history or physical exam  -c/w methimazole 20mg daily  reviewed possible side effects and signs and symptoms of adverse reactions including transaminitis and neutropenia  -can stop propranolol  -start atenolol 25mg daily     #D/C recs  -c/w methimazole 20mg daily  -atenolol 25mg daily  -patient should get close follow up with endocrinology. Will provide her a list of locations. She lives in [place] and wants to follow up closer to home if possible    #DM2  -switch to low correction scale with meals and before bedtime  -FSG before meals and before bedtime  #D/C  -increase metformin to 1000mg twice per day  -follow up with PCP for nephropathy, retinopathy screening  -no signs of retinopathy or neuropathy from history at this time    #HLD  -c/w lipitor 40mg daily    #HTN  -BP at goal, if elevated >130/80 consistently can consider adding on ACEi/ARB therapy    Case discussed with Dr. Estephanie Alan MD  Endocrine Fellow  Can be reached via teams. For follow up questions, discharge recommendations, or new consults, please call answering service at [phone] (weekdays); [phone] (nights/weekends)

## 2023-01-24 NOTE — H&P ADULT - NSHPLABSRESULTS_GEN_ALL_CORE
.  LABS:                         13.1   16.27 )-----------( 270      ( 23 Jan 2023 23:39 )             40.8     01-23    140  |  103  |  27<H>  ----------------------------<  179<H>  4.4   |  25  |  0.56    Ca    10.1      23 Jan 2023 23:39    TPro  7.4  /  Alb  4.2  /  TBili  0.5  /  DBili  x   /  AST  30  /  ALT  66<H>  /  AlkPhos  68  01-23                  RADIOLOGY, EKG & ADDITIONAL TESTS: Reviewed.

## 2023-01-24 NOTE — CHART NOTE - NSCHARTNOTEFT_GEN_A_CORE
57 year old F hx of HLD, DM2 , and recently diagnosed hyperthyroidism who presents for follow up. She has had 1 month weight loss and intermittent palpitations. She denies chest pain or sob. She was seen by PCP that showed low TSH 0.01, T3 388, free t4 5.1. ED course HR 90s, given propranolol total 20 mg. Endocrine was consulted in ED.       Admitted for Thyrotoxicosis   D/C Endo recs appreciated         Time spent co ordinating plan 41 minutes

## 2023-01-24 NOTE — DISCHARGE NOTE PROVIDER - CARE PROVIDERS DIRECT ADDRESSES
,DirectAddress_Unknown,DirectAddress_Unknown ,DirectAddress_Unknown,DirectAddress_Unknown,[email or web address]

## 2023-01-24 NOTE — PATIENT PROFILE ADULT - FALL HARM RISK - HARM RISK INTERVENTIONS

## 2023-01-24 NOTE — DISCHARGE NOTE PROVIDER - NSDCMRMEDTOKEN_GEN_ALL_CORE_FT
atenolol 25 mg oral tablet: 1 tab(s) orally once a day  Farxiga 5 mg oral tablet: 1 tab(s) orally once a day  Lipitor 40 mg oral tablet: 1 tab(s) orally once a day  metFORMIN 500 mg oral tablet: 2 tab(s) orally 2 times a day   methIMAzole 10 mg oral tablet: 2 tab(s) orally every 24 hours

## 2023-01-24 NOTE — DISCHARGE NOTE PROVIDER - NSFOLLOWUPCLINICS_GEN_ALL_ED_FT
[facility]  Endocrinology  [address] [place] [address]  Phone: [phone]  Fax:     [facility]  Endocrinology  [address] [place] [address]  Phone: [phone]  Fax: [phone]

## 2023-01-24 NOTE — H&P ADULT - HISTORY OF PRESENT ILLNESS
INCOMPLETE  57-year-old female with past medical history of  hyperlipidemia and diabetes.  Patient is on metformin and Farxiga.  Patient presents to the ED with 1 month of change in voice, weight loss, tremors and intermittent palpitations.  Patient denies any chest pain or shortness of breath.  Patient was seen by PMD few days ago and had blood work which showed low TSH of less than 0.01 with T3 total 388 and free T4 5.1.  Patient attempted to get follow-up with endocrine but was only able to get appointment in May.  Patient also notes change in voice.  Patient denies any new medications but did start krill oil after symptoms started. Patient is a 57 year old F hx of HLD, DM2 , and recently diagnosed hyperthyroidism who presents for follow up. She has had 1 month weight loss and intermittent palpitations. She denies chest pain or sob. She was seen by PCP that showed low TSH 0.01, T3 388, free t4 5.1. ED course HR 90s, given propranolol total 20 mg. Endocrine was consulted in ED.     ED vitals: afebrile, HR 90s, /50, RR 18 98% RA

## 2023-01-24 NOTE — DISCHARGE NOTE PROVIDER - CARE PROVIDER_API CALL
Endocrinology,   Phone: (   )    -  Fax: (   )    -  Follow Up Time:     SHELBI DAY  Internal Medicine  [address] [place] [address]  Phone: [phone]  Fax: ()-  Follow Up Time:    SHELBI DAY  Internal Medicine  [id number] [place] [address]  Phone: [phone]  Fax: ()-  Follow Up Time:     Endocrinology,   Phone: (   )    -  Fax: (   )    -  Follow Up Time:     Charles Junior (DO)  Internal Medicine  [address] [facility], [address]  [place] [address]  Phone: [phone]  Fax: [phone]  Follow Up Time:

## 2023-01-25 ENCOUNTER — TRANSCRIPTION ENCOUNTER (OUTPATIENT)
Age: 58
End: 2023-01-25

## 2023-01-25 VITALS
RESPIRATION RATE: 17 BRPM | DIASTOLIC BLOOD PRESSURE: 51 MMHG | HEART RATE: 62 BPM | TEMPERATURE: 99 F | SYSTOLIC BLOOD PRESSURE: 121 MMHG | OXYGEN SATURATION: 98 %

## 2023-01-25 PROBLEM — Z00.00 ENCOUNTER FOR PREVENTIVE HEALTH EXAMINATION: Status: ACTIVE | Noted: 2023-01-25

## 2023-01-25 LAB
ANION GAP SERPL CALC-SCNC: 8 MMOL/L — SIGNIFICANT CHANGE UP (ref 7–14)
APPEARANCE UR: CLEAR — SIGNIFICANT CHANGE UP
BACTERIA # UR AUTO: NEGATIVE — SIGNIFICANT CHANGE UP
BILIRUB UR-MCNC: NEGATIVE — SIGNIFICANT CHANGE UP
BUN SERPL-MCNC: 22 MG/DL — SIGNIFICANT CHANGE UP (ref 7–23)
CALCIUM SERPL-MCNC: 10.1 MG/DL — SIGNIFICANT CHANGE UP (ref 8.4–10.5)
CHLORIDE SERPL-SCNC: 106 MMOL/L — SIGNIFICANT CHANGE UP (ref 98–107)
CO2 SERPL-SCNC: 25 MMOL/L — SIGNIFICANT CHANGE UP (ref 22–31)
COLOR SPEC: SIGNIFICANT CHANGE UP
CREAT SERPL-MCNC: 0.41 MG/DL — LOW (ref 0.5–1.3)
DIFF PNL FLD: NEGATIVE — SIGNIFICANT CHANGE UP
EGFR: 115 ML/MIN/1.73M2 — SIGNIFICANT CHANGE UP
EPI CELLS # UR: 1 /HPF — SIGNIFICANT CHANGE UP (ref 0–5)
GLUCOSE BLDC GLUCOMTR-MCNC: 119 MG/DL — HIGH (ref 70–99)
GLUCOSE BLDC GLUCOMTR-MCNC: 130 MG/DL — HIGH (ref 70–99)
GLUCOSE BLDC GLUCOMTR-MCNC: 174 MG/DL — HIGH (ref 70–99)
GLUCOSE SERPL-MCNC: 123 MG/DL — HIGH (ref 70–99)
GLUCOSE UR QL: ABNORMAL
HCT VFR BLD CALC: 39.7 % — SIGNIFICANT CHANGE UP (ref 34.5–45)
HGB BLD-MCNC: 12.7 G/DL — SIGNIFICANT CHANGE UP (ref 11.5–15.5)
INSULIN-LIKE GROWTH FACTOR 1 INTERPRETATION: SIGNIFICANT CHANGE UP
INSULIN-LIKE GROWTH FACTOR 1: 187 NG/ML — SIGNIFICANT CHANGE UP (ref 48–235)
KETONES UR-MCNC: NEGATIVE — SIGNIFICANT CHANGE UP
LEUKOCYTE ESTERASE UR-ACNC: ABNORMAL
MAGNESIUM SERPL-MCNC: 1.9 MG/DL — SIGNIFICANT CHANGE UP (ref 1.6–2.6)
MCHC RBC-ENTMCNC: 27 PG — SIGNIFICANT CHANGE UP (ref 27–34)
MCHC RBC-ENTMCNC: 32 GM/DL — SIGNIFICANT CHANGE UP (ref 32–36)
MCV RBC AUTO: 84.3 FL — SIGNIFICANT CHANGE UP (ref 80–100)
NITRITE UR-MCNC: NEGATIVE — SIGNIFICANT CHANGE UP
NRBC # BLD: 0 /100 WBCS — SIGNIFICANT CHANGE UP (ref 0–0)
NRBC # FLD: 0 K/UL — SIGNIFICANT CHANGE UP (ref 0–0)
PH UR: 5.5 — SIGNIFICANT CHANGE UP (ref 5–8)
PHOSPHATE SERPL-MCNC: 5.5 MG/DL — HIGH (ref 2.5–4.5)
PLATELET # BLD AUTO: 225 K/UL — SIGNIFICANT CHANGE UP (ref 150–400)
POTASSIUM SERPL-MCNC: 4 MMOL/L — SIGNIFICANT CHANGE UP (ref 3.5–5.3)
POTASSIUM SERPL-SCNC: 4 MMOL/L — SIGNIFICANT CHANGE UP (ref 3.5–5.3)
PROT UR-MCNC: NEGATIVE — SIGNIFICANT CHANGE UP
RBC # BLD: 4.71 M/UL — SIGNIFICANT CHANGE UP (ref 3.8–5.2)
RBC # FLD: 12.3 % — SIGNIFICANT CHANGE UP (ref 10.3–14.5)
RBC CASTS # UR COMP ASSIST: 1 /HPF — SIGNIFICANT CHANGE UP (ref 0–4)
SODIUM SERPL-SCNC: 139 MMOL/L — SIGNIFICANT CHANGE UP (ref 135–145)
SP GR SPEC: 1.02 — SIGNIFICANT CHANGE UP (ref 1.01–1.05)
T4 FREE SERPL-MCNC: >5 NG/DL — HIGH (ref 0.9–1.8)
UROBILINOGEN FLD QL: SIGNIFICANT CHANGE UP
WBC # BLD: 5.07 K/UL — SIGNIFICANT CHANGE UP (ref 3.8–10.5)
WBC # FLD AUTO: 5.07 K/UL — SIGNIFICANT CHANGE UP (ref 3.8–10.5)
WBC UR QL: 4 /HPF — SIGNIFICANT CHANGE UP (ref 0–5)

## 2023-01-25 RX ADMIN — Medication 2: at 12:47

## 2023-01-25 NOTE — DISCHARGE NOTE NURSING/CASE MANAGEMENT/SOCIAL WORK - NSDCPEFALRISK_GEN_ALL_CORE
For information on Fall & Injury Prevention, visit: [email or web address] OR  [email or web address] OR  https://www.cdc.gov/steadi/patient.html

## 2023-01-25 NOTE — DISCHARGE NOTE NURSING/CASE MANAGEMENT/SOCIAL WORK - NSDPDISTO_GEN_ALL_CORE
Verified Results  HEMOGLOBIN A1C GLYCOSYLATED 09Mar2017 12:01AM [facility]   [Mar 10, 2017 12:44PM [facility]]  Diabetes well controlled; continue current medications     Test Name Result Flag Reference   HEMOGLOBIN A1C GLYH 6.7 % H 4.5-5.6   ----DIABETIC SCREENING---  NON DIABETIC                 <5.7%  INCREASED RISK                5.7-6.4%  DIAGNOSTIC FOR DIABETES      >6.4%     ----DIABETIC CONTROL---     A1C%           eAG mg/dL  6.0            126  6.5            140  7.0            154  7.5            169  8.0            183  8.5            197  9.0            212  9.5            226  10.0           240     MICROALBUMIN, URINE 09Mar2017 12:01AM [facility]   [Mar 10, 2017 12:44PM [facility]]  Diabetes well controlled; continue current medications     Test Name Result Flag Reference   MICROALBUMIN 41.10 mg/dl     CREATININE RANDOM URINE 68.30 mg/dl     MICROALB/CREAT RATIO 601.8 mcg/mg H <30   Consistent with clinical albuminuria.        Home

## 2023-01-25 NOTE — DISCHARGE NOTE NURSING/CASE MANAGEMENT/SOCIAL WORK - PATIENT PORTAL LINK FT
You can access the FollowMyHealth Patient Portal offered by [facility] by registering at the following website: [email or web address]. By joining [facility]’s FollowMyHealth portal, you will also be able to view your health information using other applications (apps) compatible with our system.

## 2023-01-26 LAB — TSI ACT/NOR SER: 2.67 IU/L — HIGH (ref 0–0.55)

## 2023-02-01 ENCOUNTER — TRANSCRIPTION ENCOUNTER (OUTPATIENT)
Age: 58
End: 2023-02-01

## 2023-02-01 ENCOUNTER — APPOINTMENT (OUTPATIENT)
Dept: ENDOCRINOLOGY | Facility: CLINIC | Age: 58
End: 2023-02-01
Payer: COMMERCIAL

## 2023-02-01 DIAGNOSIS — U07.1 COVID-19: ICD-10-CM

## 2023-02-01 DIAGNOSIS — Z86.39 PERSONAL HISTORY OF OTHER ENDOCRINE, NUTRITIONAL AND METABOLIC DISEASE: ICD-10-CM

## 2023-02-01 DIAGNOSIS — Z86.018 PERSONAL HISTORY OF OTHER BENIGN NEOPLASM: ICD-10-CM

## 2023-02-01 DIAGNOSIS — Z78.9 OTHER SPECIFIED HEALTH STATUS: ICD-10-CM

## 2023-02-01 PROCEDURE — 99205 OFFICE O/P NEW HI 60 MIN: CPT | Mod: 95

## 2023-02-01 PROCEDURE — 99215 OFFICE O/P EST HI 40 MIN: CPT | Mod: 95

## 2023-02-01 NOTE — ADDENDUM
[FreeTextEntry1] :  70 minutes spent the time noted on the day of this patient encounter preparing for, reviewing records, providing and documenting the above E/M service and 50% of time spent face to face counseling and education provided to patient on disease course, and treatment/management. All questions and concerns were answered and addressed in detail.

## 2023-02-01 NOTE — REASON FOR VISIT
[Home] : at home, [unfilled] , at the time of the visit. [Medical Office: ([facility])___] : at the medical office located in  [Patient] : the patient [Self] : self [Initial Evaluation] : an initial evaluation [Hyperthyroidism] : hyperthyroidism

## 2023-02-01 NOTE — ASSESSMENT
[FreeTextEntry1] : \par - TSI: 2.67\par - Most recent TFTs ishows FT4 is high 5.1\par - Currently on methimazole  20 mg daily and atenolol 25 mg daily\par - Her sympathetic symptoms are not well controlled still\par Increase atenolol to 50 mg daily\par - Check the lab work as below on 8th Feb 2023\par - Discussed definitive therapy with [facility] vs. surgery, Given patient has Goiter with mild compressive symptoms will recommend to total thyroidectomy rather radioactive iodine or continued methimazole treatment.  \par She wants to continue methimazole for now\par \par Plan:\par - Check TSH, free T4, total T3, and TSI\par - Check CBC and LFTs\par - Continue methimazole 20 mg daily, may adjust depending on results of above\par - Counseled on side effects of methimazole, patient to call clinic if develops sore throat, fever, abdominal pain, or jaundice\par Discussed to follow up with ophthalmologist to rule out Graves eye disease. \par Will do thyroid ultrasound with color flow doppler ultrasonography to evaluate for nodule and vascularity respectively. \par - Return to clinic as in person visit in 4- 6 weeks

## 2023-02-01 NOTE — REVIEW OF SYSTEMS
[Fatigue] : fatigue [Recent Weight Loss (___ Lbs)] : recent weight loss: [unfilled] lbs [Dysphagia] : dysphagia [Dysphonia] : dysphonia [Palpitations] : palpitations [Fast Heart Rate] : fast heart rate [Nausea] : nausea [Constipation] : constipation [Muscle Weakness] : muscle weakness [Myalgia] : myalgia  [Joint Stiffness] : joint stiffness [Muscle Cramps] : muscle cramps [Headaches] : headaches [Tremors] : tremors [Insomnia] : insomnia [Anxiety] : anxiety [Neck Pain] : no neck pain [Chest Pain] : no chest pain [Lower Ext Edema] : no lower extremity edema [Shortness Of Breath] : no shortness of breath [Cough] : no cough [SOB on Exertion] : no shortness of breath on exertion [Vomiting] : no vomiting [Dizziness] : no dizziness [Pain/Numbness of Digits] : no pain/numbness of digits [Heat Intolerance] : no heat intolerance [FreeTextEntry3] : No proptosis, No gritty sensation,

## 2023-02-01 NOTE — HISTORY OF PRESENT ILLNESS
[FreeTextEntry1] : 57-year-old Female with PMH of HLD, T2DM, hepatitis A, uterine leiomyoma s/p hysterectomy with ovarian removal, Hyperthyroidism, hyperlipidemia, COVID infection is scheduled for tele visit appointment for evaluation of hyperthyroidism.\par \par Patient reports that two months ago she developed neck swelling, hands shaking and tremors, voice hoarseness, intermittent palpitations, 22 lb weight loss.  PCP did the Blood work in September 2022 and TFTs were mildly abnormal. Therefore recommended to see endocrinologist. She could not find the appointment with endocrinologist, therefore she recently admitted to [facility] for hyperthyroidism. Labs were remarkable for TSH <0.01, Total T3: 338 and Free T4: 5.1. TSI: 2.67, thyroglobulin: 247.0. She was discharged on Methimazole 20 mg daily and atenolol 25 mg daily on 25th Jan 2023.  \par \par She monitors /73\par  heart rate:  75, 63, 68,69\par Current meds: metformin, farxiga, atorvastatin, low dose aspirin, methimazole 20 mg daily and atenolol 25 mg daily\par Denies use of amiodarone and hx of radiation\par No family hx hyperthyroidism

## 2023-02-12 LAB
ALBUMIN SERPL ELPH-MCNC: 4.1 G/DL
ALP BLD-CCNC: 87 U/L
ALT SERPL-CCNC: 53 U/L
ANION GAP SERPL CALC-SCNC: 19 MMOL/L
AST SERPL-CCNC: 28 U/L
BASOPHILS # BLD AUTO: 0.02 K/UL
BASOPHILS NFR BLD AUTO: 0.3 %
BILIRUB SERPL-MCNC: 0.4 MG/DL
BUN SERPL-MCNC: 17 MG/DL
CALCIUM SERPL-MCNC: 9.3 MG/DL
CHLORIDE SERPL-SCNC: 102 MMOL/L
CO2 SERPL-SCNC: 21 MMOL/L
CREAT SERPL-MCNC: 0.51 MG/DL
EGFR: 109 ML/MIN/1.73M2
EOSINOPHIL # BLD AUTO: 0.06 K/UL
EOSINOPHIL NFR BLD AUTO: 0.9 %
GLUCOSE SERPL-MCNC: 190 MG/DL
HCT VFR BLD CALC: 43.4 %
HGB BLD-MCNC: 13.7 G/DL
IMM GRANULOCYTES NFR BLD AUTO: 0.1 %
LYMPHOCYTES # BLD AUTO: 2.43 K/UL
LYMPHOCYTES NFR BLD AUTO: 34.7 %
MAN DIFF?: NORMAL
MCHC RBC-ENTMCNC: 27.8 PG
MCHC RBC-ENTMCNC: 31.6 GM/DL
MCV RBC AUTO: 88 FL
MONOCYTES # BLD AUTO: 0.41 K/UL
MONOCYTES NFR BLD AUTO: 5.9 %
NEUTROPHILS # BLD AUTO: 4.07 K/UL
NEUTROPHILS NFR BLD AUTO: 58.1 %
PLATELET # BLD AUTO: 267 K/UL
POTASSIUM SERPL-SCNC: 4.5 MMOL/L
PROT SERPL-MCNC: 6.6 G/DL
RBC # BLD: 4.93 M/UL
RBC # FLD: 13.1 %
SODIUM SERPL-SCNC: 141 MMOL/L
T3 SERPL-MCNC: 205 NG/DL
T4 FREE SERPL-MCNC: 2.2 NG/DL
TSH SERPL-ACNC: <0.01 UIU/ML
WBC # FLD AUTO: 7 K/UL

## 2023-02-13 ENCOUNTER — APPOINTMENT (OUTPATIENT)
Dept: ENDOCRINOLOGY | Facility: CLINIC | Age: 58
End: 2023-02-13

## 2023-02-14 LAB — TSI ACT/NOR SER: 2.46 IU/L

## 2023-02-19 ENCOUNTER — RX RENEWAL (OUTPATIENT)
Age: 58
End: 2023-02-19

## 2023-03-08 ENCOUNTER — APPOINTMENT (OUTPATIENT)
Dept: ENDOCRINOLOGY | Facility: CLINIC | Age: 58
End: 2023-03-08
Payer: COMMERCIAL

## 2023-03-08 VITALS
OXYGEN SATURATION: 98 % | HEART RATE: 53 BPM | TEMPERATURE: 98.6 F | RESPIRATION RATE: 16 BRPM | DIASTOLIC BLOOD PRESSURE: 65 MMHG | WEIGHT: 128 LBS | SYSTOLIC BLOOD PRESSURE: 108 MMHG

## 2023-03-08 VITALS — HEIGHT: 67 IN | BODY MASS INDEX: 20.05 KG/M2

## 2023-03-08 LAB
GLUCOSE BLDC GLUCOMTR-MCNC: 128
HBA1C MFR BLD HPLC: 7.1

## 2023-03-08 PROCEDURE — 82962 GLUCOSE BLOOD TEST: CPT

## 2023-03-08 PROCEDURE — 83036 HEMOGLOBIN GLYCOSYLATED A1C: CPT | Mod: QW

## 2023-03-08 PROCEDURE — 99215 OFFICE O/P EST HI 40 MIN: CPT | Mod: 25

## 2023-03-08 NOTE — PHYSICAL EXAM
[Alert] : alert [No Acute Distress] : no acute distress [Normal Sclera/Conjunctiva] : normal sclera/conjunctiva [No Lid Lag] : no lid lag [No LAD] : no lymphadenopathy [Supple] : the neck was supple [Normal Rate and Effort] : normal respiratory rate and effort [No Respiratory Distress] : no respiratory distress [Clear to Auscultation] : lungs were clear to auscultation bilaterally [Normal S1, S2] : normal S1 and S2 [No Murmurs] : no murmurs [Normal Rate] : heart rate was normal [Regular Rhythm] : with a regular rhythm [No Rubs] : no pericardial rub [Not Tender] : non-tender [Soft] : abdomen soft [No HSM] : no hepato-splenomegaly [No Rash] : no rash [No Skin Lesions] : no skin lesions [Cranial Nerves Intact] : cranial nerves 2-12 were intact [No Motor Deficits] : the motor exam was normal [No Sensory Deficits] : the sensory exam was normal to light touch and pinprick [Normal Reflexes] : deep tendon reflexes were 2+ and symmetric [No Tremors] : no tremors [Oriented x3] : oriented to person, place, and time [Abdominal Striae] : no abdominal striae [de-identified] : Impaired Left eye muscle movement, Mild Proptosis [de-identified] : Left-sided thyroid nodule palpable smooth in margins

## 2023-03-08 NOTE — ADDENDUM
[FreeTextEntry1] : 54  minutes spent the time noted on the day of this patient encounter preparing for, reviewing records, providing and documenting the above E/M service and 50% of time spent face to face counseling and education provided to patient on disease course, and treatment/management. All questions and concerns were answered and addressed in detail.

## 2023-03-08 NOTE — HISTORY OF PRESENT ILLNESS
[FreeTextEntry1] : 57-year-old Female with PMH of HLD, T2DM, hepatitis A, uterine leiomyoma s/p hysterectomy with ovarian removal due to ovarian cyst and fibroids, Duane's syndrome of left eye, Hyperthyroidism, hyperlipidemia, COVID infection is scheduled for follow up.\par \par Hyperthyroidism Hx:\par Two months ago, patient developed neck swelling, tremors, voice hoarseness, intermittent palpitations, 22 lb weight loss. PCP did the Blood work in September 2022 and TFTs were mildly abnormal. Then she was admitted to [facility] for hyperthyroidism. Labs were remarkable for TSH <0.01, Total T3: 338 and Free T4: 5.1. TSI: 2.67, thyroglobulin: 247.0. She was discharged on Methimazole 20 mg daily and atenolol 25 mg daily on 25th Jan 2023. \par \par On last visit and in the interim, methimazole was reduced to 10 mg daily because of the generalized rash and improving free T4 levels. Patient has also seen an opthalmologist Dr. Chris Veliz and was told that her thyroid eye disease is stable\par \par Today patient reports that her hypothyroid and symptoms have improved. \par Most recent lab test shows Lab results; March 6, 2023\par TSH:< 0.01\par Free T4: 1.3\par Total T3: 144\par CBC negative for an agranulocytosis.\par Liver function tests are also normal\par \par Thyroid Ultrasound\par Patient had a thyroid ultrasound on February 23.  The thyroid ultrasound was significant for 4 nodules\par Nodule 1 is in the mid right lobe which is 2.5 x 2 cm x 1.8 cm, isoechoic, wider than tall and smooth with no calcification.\par Nodule 2 is located at the left lobe anteriorly which is 1.8 x 0.9 x 1.5 cm solid hypoechoic wider than tall with smooth margins.\par Nodule 3 is located on the upper pole of the left lobe measuring 1.4 x 1.6 x 1.6 cm.  It is solid hyperechoic wider than tall and his mood borders.\par Nodule 4 is located on the left lower pole measuring 2.1 x 2.2 x 2.4 cm.  It is solid hypoechoic wider than tall with macrocalcification.\par \par Denies use of amiodarone and hx of radiation\par No family hx hyperthyroidism\par \par Diabetes:\par Patient's diabetes is better controlled.  She takes metformin and Farxiga.

## 2023-03-08 NOTE — ASSESSMENT
[FreeTextEntry1] : Thyrotoxicosis secondary to Graves' disease:\par Patient's symptoms has improved.  Clinical exam is negative for tremors.\par Vital signs are remarkable for heart rate of 56.\par Most recent free T4 has been normalized.  TSH is still suppressed.  \par Decrease methimazole to 5 mg daily.\par Advised to stop the atenolol as heart rate is decreased.  Counseled that to check the pulse at home.  If her heart rate is greater than 90, she can take half a dose of atenolol 25 mg.\par \par Multiple thyroid nodules:\par Patient has multiple thyroid nodules that are meeting FNA biopsy criteria.  As the TSI positive and patient has Graves' disease, it is unlikely that these nodules are hot nodules. therefore we will proceed with FNA biopsy first to evaluate for thyroid cancer.\par Referral to surgeon Dr. Calderon for repeat thyroid ultrasound and FNA biopsy.\par Discussed at length the FNA biopsy procedure with the patient.\par \par Repeat TFTs in 8 weeks\par \par Type 2 diabetes:\par A1c 7.1 and fingerstick in the office is at goal as well\par Continue metformin 1000 mg twice daily and Farxiga 5 mg daily.\par \par RTC in May 2023\par \par

## 2023-03-08 NOTE — REVIEW OF SYSTEMS
[Fatigue] : fatigue [Nausea] : nausea [Constipation] : constipation [Polyuria] : polyuria [Eye Pain] : no pain [Blurred Vision] : no blurred vision [Eyes Itch] : no itch [Dysphagia] : no dysphagia [Neck Pain] : no neck pain [Dysphonia] : no dysphonia [Chest Pain] : no chest pain [Palpitations] : no palpitations [Lower Ext Edema] : no lower extremity edema [Shortness Of Breath] : no shortness of breath [Cough] : no cough [Vomiting] : no vomiting [Hair Loss] : no hair loss [Headaches] : no headaches [Tremors] : no tremors [Insomnia] : no insomnia [Heat Intolerance] : no heat intolerance [Hot Flashes] : no hot flashes [FreeTextEntry2] : Normal Appetite, Weight stable

## 2023-03-10 LAB
ALBUMIN SERPL ELPH-MCNC: 4.2 G/DL
ALP BLD-CCNC: 115 U/L
ALT SERPL-CCNC: 27 U/L
ANION GAP SERPL CALC-SCNC: 12 MMOL/L
AST SERPL-CCNC: 19 U/L
BASOPHILS # BLD AUTO: 0.03 K/UL
BASOPHILS NFR BLD AUTO: 0.4 %
BILIRUB SERPL-MCNC: 0.2 MG/DL
BUN SERPL-MCNC: 16 MG/DL
CALCIUM SERPL-MCNC: 9.4 MG/DL
CHLORIDE SERPL-SCNC: 105 MMOL/L
CO2 SERPL-SCNC: 25 MMOL/L
CREAT SERPL-MCNC: 0.51 MG/DL
EGFR: 109 ML/MIN/1.73M2
EOSINOPHIL # BLD AUTO: 0.05 K/UL
EOSINOPHIL NFR BLD AUTO: 0.7 %
GLUCOSE SERPL-MCNC: 120 MG/DL
HCT VFR BLD CALC: 41.6 %
HGB BLD-MCNC: 13 G/DL
IMM GRANULOCYTES NFR BLD AUTO: 0.4 %
LYMPHOCYTES # BLD AUTO: 2.39 K/UL
LYMPHOCYTES NFR BLD AUTO: 34.9 %
MAN DIFF?: NORMAL
MCHC RBC-ENTMCNC: 27.3 PG
MCHC RBC-ENTMCNC: 31.3 GM/DL
MCV RBC AUTO: 87.4 FL
MONOCYTES # BLD AUTO: 0.45 K/UL
MONOCYTES NFR BLD AUTO: 6.6 %
NEUTROPHILS # BLD AUTO: 3.9 K/UL
NEUTROPHILS NFR BLD AUTO: 57 %
PLATELET # BLD AUTO: 242 K/UL
POTASSIUM SERPL-SCNC: 4.5 MMOL/L
PROT SERPL-MCNC: 6.6 G/DL
RBC # BLD: 4.76 M/UL
RBC # FLD: 13.6 %
SODIUM SERPL-SCNC: 142 MMOL/L
T3 SERPL-MCNC: 144 NG/DL
T4 FREE SERPL-MCNC: 1.3 NG/DL
TSH SERPL-ACNC: <0.01 UIU/ML
WBC # FLD AUTO: 6.85 K/UL

## 2023-04-07 ENCOUNTER — APPOINTMENT (OUTPATIENT)
Dept: ENDOCRINOLOGY | Facility: CLINIC | Age: 58
End: 2023-04-07

## 2023-04-19 ENCOUNTER — APPOINTMENT (OUTPATIENT)
Dept: SURGERY | Facility: CLINIC | Age: 58
End: 2023-04-19
Payer: COMMERCIAL

## 2023-04-19 VITALS
HEIGHT: 64 IN | WEIGHT: 127 LBS | DIASTOLIC BLOOD PRESSURE: 80 MMHG | TEMPERATURE: 97 F | OXYGEN SATURATION: 99 % | BODY MASS INDEX: 21.68 KG/M2 | SYSTOLIC BLOOD PRESSURE: 122 MMHG | HEART RATE: 70 BPM

## 2023-04-19 DIAGNOSIS — Z83.3 FAMILY HISTORY OF DIABETES MELLITUS: ICD-10-CM

## 2023-04-19 DIAGNOSIS — Z56.0 UNEMPLOYMENT, UNSPECIFIED: ICD-10-CM

## 2023-04-19 PROCEDURE — 10006 FNA BX W/US GDN EA ADDL: CPT

## 2023-04-19 PROCEDURE — 10005 FNA BX W/US GDN 1ST LES: CPT

## 2023-04-19 RX ORDER — METFORMIN HYDROCHLORIDE 625 MG/1
TABLET ORAL
Refills: 0 | Status: ACTIVE | COMMUNITY

## 2023-04-19 RX ORDER — ATORVASTATIN CALCIUM 20 MG/1
20 TABLET, FILM COATED ORAL
Refills: 0 | Status: ACTIVE | COMMUNITY

## 2023-04-19 SDOH — ECONOMIC STABILITY - INCOME SECURITY: UNEMPLOYMENT, UNSPECIFIED: Z56.0

## 2023-04-21 LAB — FNA, THYROID: NORMAL

## 2023-05-11 ENCOUNTER — APPOINTMENT (OUTPATIENT)
Dept: ENDOCRINOLOGY | Facility: CLINIC | Age: 58
End: 2023-05-11
Payer: COMMERCIAL

## 2023-05-11 VITALS
HEIGHT: 64 IN | WEIGHT: 124 LBS | BODY MASS INDEX: 21.17 KG/M2 | SYSTOLIC BLOOD PRESSURE: 147 MMHG | DIASTOLIC BLOOD PRESSURE: 78 MMHG | OXYGEN SATURATION: 98 % | HEART RATE: 73 BPM | TEMPERATURE: 97.8 F | RESPIRATION RATE: 16 BRPM

## 2023-05-11 DIAGNOSIS — E11.9 TYPE 2 DIABETES MELLITUS W/OUT COMPLICATIONS: ICD-10-CM

## 2023-05-11 DIAGNOSIS — G25.2 OTHER SPECIFIED FORMS OF TREMOR: ICD-10-CM

## 2023-05-11 LAB
ALBUMIN SERPL ELPH-MCNC: 4.4 G/DL
ALP BLD-CCNC: 89 U/L
ALT SERPL-CCNC: 39 U/L
ANION GAP SERPL CALC-SCNC: 15 MMOL/L
AST SERPL-CCNC: 24 U/L
BASOPHILS # BLD AUTO: 0.02 K/UL
BASOPHILS NFR BLD AUTO: 0.3 %
BILIRUB SERPL-MCNC: 0.3 MG/DL
BUN SERPL-MCNC: 17 MG/DL
CALCIUM SERPL-MCNC: 10.2 MG/DL
CHLORIDE SERPL-SCNC: 104 MMOL/L
CO2 SERPL-SCNC: 21 MMOL/L
CREAT SERPL-MCNC: 0.48 MG/DL
EGFR: 110 ML/MIN/1.73M2
EOSINOPHIL # BLD AUTO: 0.04 K/UL
EOSINOPHIL NFR BLD AUTO: 0.5 %
GLUCOSE SERPL-MCNC: 109 MG/DL
HCT VFR BLD CALC: 43.4 %
HGB BLD-MCNC: 13.9 G/DL
IMM GRANULOCYTES NFR BLD AUTO: 0.3 %
LYMPHOCYTES # BLD AUTO: 2.63 K/UL
LYMPHOCYTES NFR BLD AUTO: 35.1 %
MAN DIFF?: NORMAL
MCHC RBC-ENTMCNC: 27.6 PG
MCHC RBC-ENTMCNC: 32 GM/DL
MCV RBC AUTO: 86.3 FL
MONOCYTES # BLD AUTO: 0.5 K/UL
MONOCYTES NFR BLD AUTO: 6.7 %
NEUTROPHILS # BLD AUTO: 4.28 K/UL
NEUTROPHILS NFR BLD AUTO: 57.1 %
PLATELET # BLD AUTO: 298 K/UL
POTASSIUM SERPL-SCNC: 4.7 MMOL/L
PROT SERPL-MCNC: 7 G/DL
RBC # BLD: 5.03 M/UL
RBC # FLD: 13.6 %
SODIUM SERPL-SCNC: 141 MMOL/L
T3 SERPL-MCNC: 232 NG/DL
T4 FREE SERPL-MCNC: 2.7 NG/DL
TSH SERPL-ACNC: <0.01 UIU/ML
WBC # FLD AUTO: 7.49 K/UL

## 2023-05-11 PROCEDURE — 99214 OFFICE O/P EST MOD 30 MIN: CPT

## 2023-05-11 RX ORDER — ATENOLOL 25 MG/1
25 TABLET ORAL DAILY
Qty: 30 | Refills: 1 | Status: DISCONTINUED | COMMUNITY
Start: 2023-02-12 | End: 2023-05-11

## 2023-05-11 RX ORDER — METHIMAZOLE 10 MG/1
10 TABLET ORAL DAILY
Qty: 90 | Refills: 0 | Status: DISCONTINUED | COMMUNITY
Start: 2023-05-11 | End: 2023-05-11

## 2023-05-11 NOTE — PHYSICAL EXAM
[Alert] : alert [No Acute Distress] : no acute distress [Normal Sclera/Conjunctiva] : normal sclera/conjunctiva [No Lid Lag] : no lid lag [No LAD] : no lymphadenopathy [Supple] : the neck was supple [No Respiratory Distress] : no respiratory distress [Normal Rate and Effort] : normal respiratory rate and effort [Clear to Auscultation] : lungs were clear to auscultation bilaterally [Normal S1, S2] : normal S1 and S2 [No Murmurs] : no murmurs [Regular Rhythm] : with a regular rhythm [No Rubs] : no pericardial rub [Not Tender] : non-tender [Soft] : abdomen soft [No HSM] : no hepato-splenomegaly [No Rash] : no rash [No Skin Lesions] : no skin lesions [Cranial Nerves Intact] : cranial nerves 2-12 were intact [No Motor Deficits] : the motor exam was normal [No Sensory Deficits] : the sensory exam was normal to light touch and pinprick [Normal Reflexes] : deep tendon reflexes were 2+ and symmetric [Oriented x3] : oriented to person, place, and time [Abdominal Striae] : no abdominal striae [de-identified] : Impaired Left eye muscle movement, Mild Proptosis [de-identified] : Left-sided thyroid nodule palpable smooth in margins [de-identified] : Mild tachycardia [de-identified] : Tremors b/l in hands

## 2023-05-11 NOTE — HISTORY OF PRESENT ILLNESS
[FreeTextEntry1] : 57-year-old Female with PMH of HLD, T2DM, hepatitis A, uterine leiomyoma s/p hysterectomy with ovarian removal due to ovarian cyst and fibroids, Duane's syndrome of left eye, Hyperthyroidism, hyperlipidemia, COVID infection is scheduled for follow up.\par \par Hyperthyroidism Hx:\par Two months ago, patient developed neck swelling, tremors, voice hoarseness, intermittent palpitations, 22 lb weight loss. PCP did the Blood work in September 2022 and TFTs were mildly abnormal. Then she was admitted to [facility] for hyperthyroidism. Labs were remarkable for TSH <0.01, Total T3: 338 and Free T4: 5.1. TSI: 2.67, thyroglobulin: 247.0. She was discharged on Methimazole 20 mg daily and atenolol 25 mg daily on 25th Jan 2023. \par \par On last visit and in the interim, methimazole was reduced to 10 mg daily because of the generalized rash and improving free T4 levels. Patient has also seen an opthalmologist Dr. Chris Veliz and was told that her thyroid eye disease is stable\par Patient has a recent thyroid blood test on second May, 2023 that showed worsening free T4 of 2.7 ng/dL, and TSH of less than 0.01\par Liver function tests are also normal, \par Patient is currently taking methimazole 5 mg daily.\par \par Patient also had a thyroid ultrasound that showed\par Thyroid Ultrasound\par Patient had a thyroid ultrasound on February 23. The thyroid ultrasound was significant for 4 nodules\par Nodule 1 is in the mid right lobe which is 2.5 x 2 cm x 1.8 cm, isoechoic, wider than tall and smooth with no calcification.\par Nodule 2 is located at the left lobe anteriorly which is 1.8 x 0.9 x 1.5 cm solid hypoechoic wider than tall with smooth margins.\par Nodule 3 is located on the upper pole of the left lobe measuring 1.4 x 1.6 x 1.6 cm. It is solid hyperechoic wider than tall and his mood borders.\par Nodule 4 is located on the left lower pole measuring 2.1 x 2.2 x 2.4 cm. 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

## 2023-05-11 NOTE — REVIEW OF SYSTEMS
[Fatigue] : fatigue [Recent Weight Loss (___ Lbs)] : recent weight loss: [unfilled] lbs [Palpitations] : palpitations [Fast Heart Rate] : fast heart rate [Constipation] : constipation [Polyuria] : polyuria [Joint Pain] : joint pain [Muscle Weakness] : muscle weakness [Myalgia] : myalgia  [Muscle Cramps] : muscle cramps [Tremors] : tremors [Insomnia] : insomnia [Heat Intolerance] : heat intolerance [Eye Pain] : no pain [Eyes Itch] : no itch [Redness] : no redness  [Dysphagia] : no dysphagia [Neck Pain] : no neck pain [Dysphonia] : no dysphonia [Chest Pain] : no chest pain [Lower Ext Edema] : no lower extremity edema [Shortness Of Breath] : no shortness of breath [Cough] : no cough [SOB on Exertion] : no shortness of breath on exertion [Nausea] : no nausea [Abdominal Pain] : no abdominal pain [Vomiting] : no vomiting [Irregular Menses] : regular menses [Dry Skin] : no dry skin [Headaches] : no headaches [Anxiety] : no anxiety [FreeTextEntry5] : Soemtimes

## 2023-05-11 NOTE — ASSESSMENT
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 However Free T4 has worsened from the past\par  Clinical exam shows coarse tremors\par Vital signs are remarkable for heart rate of 73.\par Increase methimazole to 10 mg daily\par Start propranolol 10 mg daily.\par \par Multiple thyroid nodules:\par Patient has multiple thyroid nodules status post FNA biopsy\par We will discuss with  regarding whether the molecular test has been sent for the nodule with the undetermined significance. \par Will likely observe the nodule at this time, given patient still is hyperthyroid. Will follow up with thyroid ultrasound w/wo repeat FNA biopsy\par \par Repeat TFTs on 15th June 2023\par \par \par RTC in June 2023

## 2023-05-11 NOTE — ADDENDUM
[FreeTextEntry1] : 30  minutes spent the time noted on the day of this patient encounter preparing for, reviewing records, providing and documenting the above E/M service and 50% of time spent face to face counseling and education provided to patient on disease course, and treatment/management. All questions and concerns were answered and addressed in detail.

## 2023-06-16 NOTE — PHYSICAL EXAM
[No Rash or Lesion] : No rash or lesion [Alert] : alert [Oriented to Person] : oriented to person [Oriented to Place] : oriented to place [Oriented to Time] : oriented to time [Calm] : calm [Normal Breath Sounds] : Normal breath sounds [Normal Heart Sounds] : normal heart sounds [Normal Rate and Rhythm] : normal rate and rhythm [de-identified] : The patient is alert, well-groomed  [de-identified] : KIRSTIN [de-identified] : Neck Trachea midline, multiple palpable thyroid nodules, no palpable lymphadenopathy, no thyroid bruit.  [de-identified] : Soft NT/ND [de-identified] : full range of motion and no deformities appreciated.

## 2023-06-16 NOTE — PLAN
[FreeTextEntry1] : PREOPERATIVE DIAGNOSIS:  Right and left thyroid Nodule\par POSTOPERATIVE DIAGNOSIS: Pending pathology\par Nodules: Right Lobe posterior heterogeneous nodule 2.2 cm x 1.8 x 1.7 cm with question microcalcifications  \par Left lobe dominant nodule heterogeneous with large course classification, 2.5 x 2.1 cm \par \par PROCEDURE:  Informed consent was obtained. All the options, benefits and risks of the procedure discussed. A time out was performed and the patient was prepped and draped in sterile fashion. The patient was anesthesized with Lidocaine with epinephrine. Using ultrasound guidance and a 25 gauge needle, 4 passes were made into each nodule. The samples were prepped- smears were made and cytolyt rinses obtained.\par Patient tolerated the procedure fine without any apparent complication.

## 2023-06-16 NOTE — HISTORY OF PRESENT ILLNESS
[de-identified] : JAYSON PANTOJA is a 58 year old female with PMHx of of HLD, T2DM, hepatitis A, uterine leiomyoma s/p hysterectomy with ovarian removal due to ovarian cyst and fibroids, Duane's syndrome of left eye, Hyperthyroidism, hyperlipidemia who present in the office for initial consultation. Patient was referred by MD Limon with chief complaint of  hyperthyroidism and thyroid nodules. The graves was diagnosed in January 2023 and the patient is currently taking methimazole.  The patient denies any compressive like symptoms. On questioning the patient denied dysphagia, SOB, cough and voice changes. The patient denies any history of radiation or family history of thyroid problems or cancer. The patient is not a andrews.\par \par Patient had a thyroid ultrasound on 02/03/2023., result were c/w with 4 nodules\par Nodule 1 is in the mid right lobe which is 2.5 x 2 cm x 1.8 cm, isoechoic, wider than tall and smooth with no calcification.\par Nodule 2 is located at the left lobe anteriorly which is 1.8 x 0.9 x 1.5 cm solid hypoechoic wider than tall with smooth margins.\par Nodule 3 is located on the upper pole of the left lobe measuring 1.4 x 1.6 x 1.6 cm. It is solid hyperechoic wider than tall and his mood borders.\par Nodule 4 is located on the left lower pole measuring 2.1 x 2.2 x 2.4 cm. It is solid hypoechoic wider than tall with macrocalcification.

## 2023-06-16 NOTE — CONSULT LETTER
[Dear  ___] : Dear  [unfilled], [Consult Letter:] : I had the pleasure of evaluating your patient, [unfilled]. [Consult Closing:] : Thank you very much for allowing me to participate in the care of this patient.  If you have any questions, please do not hesitate to contact me. [Sincerely,] : Sincerely, [FreeTextEntry3] : Joceline Calderon MD FACS

## 2023-06-16 NOTE — ASSESSMENT
[FreeTextEntry1] : JAYSON PANTOJA is a 58 year old female presents with Graves hyperthyroidism.\par \par Thyroid Ultrasound \par \par Diffuse heterogeneity of both lobes \par \par Right Lobe posterior heterogenous nodule measuring 2.2 cm x 1.8 x 1.7 cm with question microcalcifications \par \par  \par Left lobe dominant nodule heterogenous with large course classification measuring 2.5 x 2.1 cm \par \par \par No Suspicious lymph nodes seen in right or left lateral neck \par \par \par We recommend FNA of dominant Right  and Left Thyroid nodules,  based on in-office ultrasound. She agrees to proceed.

## 2023-06-16 NOTE — DATA REVIEWED
[FreeTextEntry1] : Exam requested by:\par NYA JENKINS MD\par [address] [facility]\par [place] [facility] [id number]\par SITE PERFORMED: [place]\par SITE PHONE: [phone]\par Patient: JAYSON PANTOJA\par YOB: 1965\par Phone: [phone]\par MRN: [id number] Acc: [id number]\par Date of Exam: 02-\par  \par EXAM:  ULTRASOUND THYROID NECK\par \par HISTORY:  Nodular goiter \par \par TECHNIQUE:  Real time sonographic imaging of the anterior neck is performed to evaluate the thyroid gland. High frequency linear array transducer is utilized to obtain grayscale and color Doppler images. Static images are provided for review. \par \par COMPARISON:  None\par \par FINDINGS:\par \par The thyroid isthmus thickness is 0.3 cm.\par \par The right lobe measures 4.7 x 2.2 x 2.1 cm in sagittal x AP x transverse dimensions.  \par The left lobe measures 5.4 x 2.8 x 2.6 cm in sagittal x AP x transverse dimensions. \par \par Overall thyroid echotexture and blood flow: There is a normal thyroid echotexture and blood flow.\par \par Thyroid nodules: We assessed up to the four most suspicious nodules as per ACR recommendations. We use the following TI-RADS lexicon:\par \par *Composition: Cystic, almost cystic, spongiform, mixed, almost solid, solid\par *Echotexture: Anechoic, isoechoic or hyperechoic, hypoechoic, very hypoechoic \par *Shape: Wider than tall, taller than wide\par *Margins: Smooth, ill-defined, lobulated or irregular, extrathyroidal extension\par *Calcification: Macrocalcifications, peripheral rim calcification, punctate foci\par \par Nodule #1:\par Location: Mid right lobe\par Size: 2.5 x 2.0 x 1.8 cm\par Morphology: Mixed, isoechoic, wider than tall, smooth, no calcification \par TI-RADS 2\par \par Nodule #2\par Location: Mid left lobe anteriorly\par Size: 1.8 x 0.9 x 1.5 cm\par Morphology: Solid, hypoechoic, wider than tall, smooth \par TI-RADS 4\par \par Nodule #3:\par Location: Upper pole left\par Size: 1.4 x 1.6 x 1.6 cm\par Morphology: Solid, hyperechoic, wider than tall, smooth \par TI-RADS 3\par \par Nodule #4:\par Location: Lower pole left\par Size: 2.1 x 2.2 x 2.4 cm\par Morphology: Solid, hypoechoic, wider than tall, smooth, macrocalcification \par TI-RADS 4\par \par There are no suspicious cervical lymph nodes.\par \par IMPRESSION:\par \par Nodule #4 is an FNA candidate.\par \par Thank you for the opportunity to participate in the care of this patient.  \par  \par Antonio Mosher MD  - Electronically Signed: 02- 2:40 PM \par Physician to Physician Direct Line is: [phone]\par

## 2023-06-23 ENCOUNTER — APPOINTMENT (OUTPATIENT)
Dept: ENDOCRINOLOGY | Facility: CLINIC | Age: 58
End: 2023-06-23
Payer: COMMERCIAL

## 2023-06-23 LAB
ALBUMIN SERPL ELPH-MCNC: 4.4 G/DL
ALP BLD-CCNC: 118 U/L
ALT SERPL-CCNC: 22 U/L
ANION GAP SERPL CALC-SCNC: 18 MMOL/L
AST SERPL-CCNC: 15 U/L
BILIRUB SERPL-MCNC: 0.3 MG/DL
BUN SERPL-MCNC: 18 MG/DL
CALCIUM SERPL-MCNC: 10.1 MG/DL
CHLORIDE SERPL-SCNC: 103 MMOL/L
CO2 SERPL-SCNC: 21 MMOL/L
CREAT SERPL-MCNC: 0.5 MG/DL
EGFR: 109 ML/MIN/1.73M2
GLUCOSE SERPL-MCNC: 195 MG/DL
POTASSIUM SERPL-SCNC: 4.3 MMOL/L
PROT SERPL-MCNC: 7 G/DL
SODIUM SERPL-SCNC: 142 MMOL/L
T3 SERPL-MCNC: 192 NG/DL
T4 FREE SERPL-MCNC: 2.3 NG/DL
TSH SERPL-ACNC: <0.01 UIU/ML

## 2023-06-23 PROCEDURE — 99441: CPT

## 2023-07-28 ENCOUNTER — APPOINTMENT (OUTPATIENT)
Dept: ENDOCRINOLOGY | Facility: CLINIC | Age: 58
End: 2023-07-28
Payer: COMMERCIAL

## 2023-07-28 LAB
ALBUMIN SERPL ELPH-MCNC: 4.6 G/DL
ALP BLD-CCNC: 127 U/L
ALT SERPL-CCNC: 17 U/L
ANION GAP SERPL CALC-SCNC: 16 MMOL/L
AST SERPL-CCNC: 16 U/L
BILIRUB SERPL-MCNC: 0.4 MG/DL
BUN SERPL-MCNC: 16 MG/DL
CALCIUM SERPL-MCNC: 10.1 MG/DL
CHLORIDE SERPL-SCNC: 102 MMOL/L
CO2 SERPL-SCNC: 22 MMOL/L
CREAT SERPL-MCNC: 0.54 MG/DL
EGFR: 107 ML/MIN/1.73M2
GLUCOSE SERPL-MCNC: 117 MG/DL
POTASSIUM SERPL-SCNC: 4.7 MMOL/L
PROT SERPL-MCNC: 7.4 G/DL
SODIUM SERPL-SCNC: 140 MMOL/L
T3 SERPL-MCNC: 188 NG/DL
T4 FREE SERPL-MCNC: 2.1 NG/DL
TSH SERPL-ACNC: <0.01 UIU/ML

## 2023-07-28 PROCEDURE — 99441: CPT

## 2023-11-13 LAB
T3 SERPL-MCNC: 120 NG/DL
T4 FREE SERPL-MCNC: 1.6 NG/DL
TSH SERPL-ACNC: <0.01 UIU/ML

## 2023-12-21 ENCOUNTER — APPOINTMENT (OUTPATIENT)
Dept: OTOLARYNGOLOGY | Facility: CLINIC | Age: 58
End: 2023-12-21
Payer: COMMERCIAL

## 2023-12-21 VITALS
HEART RATE: 65 BPM | DIASTOLIC BLOOD PRESSURE: 84 MMHG | SYSTOLIC BLOOD PRESSURE: 143 MMHG | HEIGHT: 64 IN | WEIGHT: 127 LBS | BODY MASS INDEX: 21.68 KG/M2

## 2023-12-21 DIAGNOSIS — D23.30 OTHER BENIGN NEOPLASM OF SKIN OF UNSPECIFIED PART OF FACE: ICD-10-CM

## 2023-12-21 PROCEDURE — 99203 OFFICE O/P NEW LOW 30 MIN: CPT

## 2023-12-21 NOTE — ASSESSMENT
[FreeTextEntry1] :  Cyst - we discussed an MRI to eval depth of the cyst as I feel the lesion is deep and related to the muscle - she will need to go to an open MRI [facility] - r/b/a discussed of removal, will refer to Oculoplastics depending on results of MRI  f/u tbd by results

## 2023-12-21 NOTE — END OF VISIT
[FreeTextEntry3] : I personally saw and examined JAYSON PANTOJA in detail.  I spoke to ESTRELLA Barton regarding the assessment and plan of care. I performed the procedures and relevant physical exam.  I have reviewed the above assessment and plan of care and I agree.  I have made changes to the body of the note wherever necessary and appropriate.

## 2023-12-21 NOTE — CONSULT LETTER
[Dear  ___] : Dear  [unfilled], [Consult Letter:] : I had the pleasure of evaluating your patient, [unfilled]. [Sincerely,] : Sincerely, [FreeTextEntry3] : Geetha El MD Otolaryngology- Facial Plastics  [address] [place] [id number] (P) - [phone] (F) - [phone]

## 2023-12-21 NOTE — HISTORY OF PRESENT ILLNESS
[de-identified] : Ms. PANTOJA is a 58 year female here for eval of nodule lateral to R eye, she first noticed this 3 months ago, no pain or drainage and has not changed in size seen by Derm and was told to f/u with facial plastics

## 2023-12-21 NOTE — PHYSICAL EXAM
[de-identified] : 5mm subcutaneous nodule which is firm and feels tethered to the muscle, distinct from overlying skin.  Slightly mobile

## 2024-01-21 ENCOUNTER — RX RENEWAL (OUTPATIENT)
Age: 59
End: 2024-01-21

## 2024-01-24 DIAGNOSIS — L72.0 EPIDERMAL CYST: ICD-10-CM

## 2024-03-08 ENCOUNTER — OUTPATIENT (OUTPATIENT)
Dept: OUTPATIENT SERVICES | Facility: HOSPITAL | Age: 59
LOS: 1 days | End: 2024-03-08
Payer: COMMERCIAL

## 2024-03-08 VITALS
DIASTOLIC BLOOD PRESSURE: 82 MMHG | HEART RATE: 63 BPM | OXYGEN SATURATION: 98 % | HEIGHT: 64.57 IN | SYSTOLIC BLOOD PRESSURE: 140 MMHG | TEMPERATURE: 99 F | RESPIRATION RATE: 20 BRPM | WEIGHT: 131.4 LBS

## 2024-03-08 DIAGNOSIS — E11.9 TYPE 2 DIABETES MELLITUS WITHOUT COMPLICATIONS: ICD-10-CM

## 2024-03-08 DIAGNOSIS — Z90.710 ACQUIRED ABSENCE OF BOTH CERVIX AND UTERUS: Chronic | ICD-10-CM

## 2024-03-08 DIAGNOSIS — L72.0 EPIDERMAL CYST: ICD-10-CM

## 2024-03-08 DIAGNOSIS — Z01.818 ENCOUNTER FOR OTHER PREPROCEDURAL EXAMINATION: ICD-10-CM

## 2024-03-08 DIAGNOSIS — Z86.39 PERSONAL HISTORY OF OTHER ENDOCRINE, NUTRITIONAL AND METABOLIC DISEASE: ICD-10-CM

## 2024-03-08 DIAGNOSIS — D31.61 BENIGN NEOPLASM OF UNSPECIFIED SITE OF RIGHT ORBIT: ICD-10-CM

## 2024-03-08 LAB
A1C WITH ESTIMATED AVERAGE GLUCOSE RESULT: 7.7 % — HIGH (ref 4–5.6)
ANION GAP SERPL CALC-SCNC: 15 MMOL/L — SIGNIFICANT CHANGE UP (ref 5–17)
BUN SERPL-MCNC: 21 MG/DL — SIGNIFICANT CHANGE UP (ref 7–23)
CALCIUM SERPL-MCNC: 10.1 MG/DL — SIGNIFICANT CHANGE UP (ref 8.4–10.5)
CHLORIDE SERPL-SCNC: 100 MMOL/L — SIGNIFICANT CHANGE UP (ref 96–108)
CO2 SERPL-SCNC: 25 MMOL/L — SIGNIFICANT CHANGE UP (ref 22–31)
CREAT SERPL-MCNC: 0.47 MG/DL — LOW (ref 0.5–1.3)
EGFR: 110 ML/MIN/1.73M2 — SIGNIFICANT CHANGE UP
ESTIMATED AVERAGE GLUCOSE: 174 MG/DL — HIGH (ref 68–114)
GLUCOSE SERPL-MCNC: 164 MG/DL — HIGH (ref 70–99)
HCT VFR BLD CALC: 43.6 % — SIGNIFICANT CHANGE UP (ref 34.5–45)
HGB BLD-MCNC: 14.8 G/DL — SIGNIFICANT CHANGE UP (ref 11.5–15.5)
MCHC RBC-ENTMCNC: 28.2 PG — SIGNIFICANT CHANGE UP (ref 27–34)
MCHC RBC-ENTMCNC: 33.9 GM/DL — SIGNIFICANT CHANGE UP (ref 32–36)
MCV RBC AUTO: 83.2 FL — SIGNIFICANT CHANGE UP (ref 80–100)
NRBC # BLD: 0 /100 WBCS — SIGNIFICANT CHANGE UP (ref 0–0)
PLATELET # BLD AUTO: 282 K/UL — SIGNIFICANT CHANGE UP (ref 150–400)
POTASSIUM SERPL-MCNC: 4 MMOL/L — SIGNIFICANT CHANGE UP (ref 3.5–5.3)
POTASSIUM SERPL-SCNC: 4 MMOL/L — SIGNIFICANT CHANGE UP (ref 3.5–5.3)
RBC # BLD: 5.24 M/UL — HIGH (ref 3.8–5.2)
RBC # FLD: 12.5 % — SIGNIFICANT CHANGE UP (ref 10.3–14.5)
SODIUM SERPL-SCNC: 140 MMOL/L — SIGNIFICANT CHANGE UP (ref 135–145)
WBC # BLD: 8.09 K/UL — SIGNIFICANT CHANGE UP (ref 3.8–10.5)
WBC # FLD AUTO: 8.09 K/UL — SIGNIFICANT CHANGE UP (ref 3.8–10.5)

## 2024-03-08 PROCEDURE — 80048 BASIC METABOLIC PNL TOTAL CA: CPT

## 2024-03-08 PROCEDURE — 85027 COMPLETE CBC AUTOMATED: CPT

## 2024-03-08 PROCEDURE — G0463: CPT

## 2024-03-08 PROCEDURE — 83036 HEMOGLOBIN GLYCOSYLATED A1C: CPT

## 2024-03-08 RX ORDER — ATORVASTATIN CALCIUM 80 MG/1
1 TABLET, FILM COATED ORAL
Qty: 0 | Refills: 0 | DISCHARGE

## 2024-03-08 RX ORDER — SODIUM CHLORIDE 9 MG/ML
1000 INJECTION, SOLUTION INTRAVENOUS
Refills: 0 | Status: DISCONTINUED | OUTPATIENT
Start: 2024-03-29 | End: 2024-04-12

## 2024-03-08 RX ORDER — DAPAGLIFLOZIN 10 MG/1
1 TABLET, FILM COATED ORAL
Qty: 0 | Refills: 0 | DISCHARGE

## 2024-03-08 NOTE — H&P PST ADULT - NSANTHOSAYNRD_GEN_A_CORE
No. NIKHIL screening performed.  STOP BANG Legend: 0-2 = LOW Risk; 3-4 = INTERMEDIATE Risk; 5-8 = HIGH Risk

## 2024-03-08 NOTE — H&P PST ADULT - PROBLEM SELECTOR PLAN 2
Check fingerstick DOS  Instructed to stop Metformin day of surgery as per protocol  Instructed to take last dose of Farxiga on 3/24/2024.

## 2024-03-08 NOTE — H&P PST ADULT - ASSESSMENT
Airway:  normal    Mallampati-       Dental: Patient denies loose teeth    Activity : ADL, Walk 15 mts daily   dasi mets : 7 dasi mets    Airway:  normal    Mallampati-    3   Dental: Patient denies loose teeth    Activity : ADL, Walk 15 mts daily   dasi mets : 7 dasi mets

## 2024-03-08 NOTE — H&P PST ADULT - HISTORY OF PRESENT ILLNESS
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 Mri revealed superficial palpable abnormality overlying the right orbital rim . Now coming in for Excision 5MM Right orbital rim cyst on 3/29/2024.    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 Mri revealed superficial palpable abnormality overlying the right orbital rim . Now coming in for Excision 5MM Right orbital rim cyst on 3/29/2024.

## 2024-03-08 NOTE — H&P PST ADULT - NSICDXPASTMEDICALHX_GEN_ALL_CORE_FT
PAST MEDICAL HISTORY:  Dermoid cyst of right orbit     Duane's syndrome, left eye     H/O hyperthyroidism     Hepatitis A---'from food' -- 1985     Hyperlipidemia     ovarian Cyst - left ovary     Palpitations     Thyrotoxicosis due to Graves' disease     Type 2 diabetes mellitus      PAST MEDICAL HISTORY:  Dermoid cyst of right orbit     Duane's syndrome, left eye     H/O hyperthyroidism     Hepatitis A---'from food' -- 1985     History of uterine fibroid     Hyperlipidemia     ovarian Cyst - left ovary     Palpitations     Thyrotoxicosis due to Graves' disease     Type 2 diabetes mellitus

## 2024-03-22 ENCOUNTER — NON-APPOINTMENT (OUTPATIENT)
Age: 59
End: 2024-03-22

## 2024-03-28 ENCOUNTER — TRANSCRIPTION ENCOUNTER (OUTPATIENT)
Age: 59
End: 2024-03-28

## 2024-03-29 ENCOUNTER — TRANSCRIPTION ENCOUNTER (OUTPATIENT)
Age: 59
End: 2024-03-29

## 2024-03-29 ENCOUNTER — RESULT REVIEW (OUTPATIENT)
Age: 59
End: 2024-03-29

## 2024-03-29 ENCOUNTER — OUTPATIENT (OUTPATIENT)
Dept: OUTPATIENT SERVICES | Facility: HOSPITAL | Age: 59
LOS: 1 days | End: 2024-03-29
Payer: COMMERCIAL

## 2024-03-29 ENCOUNTER — APPOINTMENT (OUTPATIENT)
Dept: OTOLARYNGOLOGY | Facility: HOSPITAL | Age: 59
End: 2024-03-29

## 2024-03-29 VITALS
HEART RATE: 52 BPM | RESPIRATION RATE: 15 BRPM | SYSTOLIC BLOOD PRESSURE: 105 MMHG | OXYGEN SATURATION: 86 % | DIASTOLIC BLOOD PRESSURE: 49 MMHG

## 2024-03-29 VITALS
SYSTOLIC BLOOD PRESSURE: 140 MMHG | WEIGHT: 131.4 LBS | RESPIRATION RATE: 20 BRPM | DIASTOLIC BLOOD PRESSURE: 82 MMHG | TEMPERATURE: 99 F | OXYGEN SATURATION: 98 % | HEIGHT: 64 IN | HEART RATE: 63 BPM

## 2024-03-29 DIAGNOSIS — L72.0 EPIDERMAL CYST: ICD-10-CM

## 2024-03-29 DIAGNOSIS — Z90.710 ACQUIRED ABSENCE OF BOTH CERVIX AND UTERUS: Chronic | ICD-10-CM

## 2024-03-29 PROCEDURE — 88342 IMHCHEM/IMCYTCHM 1ST ANTB: CPT | Mod: 26,59

## 2024-03-29 PROCEDURE — 88341 IMHCHEM/IMCYTCHM EA ADD ANTB: CPT

## 2024-03-29 PROCEDURE — 88360 TUMOR IMMUNOHISTOCHEM/MANUAL: CPT

## 2024-03-29 PROCEDURE — 88341 IMHCHEM/IMCYTCHM EA ADD ANTB: CPT | Mod: 26,59

## 2024-03-29 PROCEDURE — ZZZZZ: CPT

## 2024-03-29 PROCEDURE — 11441 EXC FACE-MM B9+MARG 0.6-1 CM: CPT

## 2024-03-29 PROCEDURE — 67412 EXPLORE/TREAT EYE SOCKET: CPT | Mod: RT

## 2024-03-29 PROCEDURE — 88304 TISSUE EXAM BY PATHOLOGIST: CPT | Mod: 26

## 2024-03-29 PROCEDURE — 88304 TISSUE EXAM BY PATHOLOGIST: CPT

## 2024-03-29 PROCEDURE — 82962 GLUCOSE BLOOD TEST: CPT

## 2024-03-29 PROCEDURE — 12051 INTMD RPR FACE/MM 2.5 CM/<: CPT

## 2024-03-29 PROCEDURE — 88342 IMHCHEM/IMCYTCHM 1ST ANTB: CPT

## 2024-03-29 RX ORDER — METHIMAZOLE 10 MG/1
1 TABLET ORAL
Refills: 0 | DISCHARGE

## 2024-03-29 RX ORDER — ONDANSETRON 8 MG/1
4 TABLET, FILM COATED ORAL ONCE
Refills: 0 | Status: DISCONTINUED | OUTPATIENT
Start: 2024-03-29 | End: 2024-04-12

## 2024-03-29 RX ORDER — LIDOCAINE HCL 20 MG/ML
0.2 VIAL (ML) INJECTION ONCE
Refills: 0 | Status: COMPLETED | OUTPATIENT
Start: 2024-03-29 | End: 2024-03-29

## 2024-03-29 RX ORDER — HYDROMORPHONE HYDROCHLORIDE 2 MG/ML
0.5 INJECTION INTRAMUSCULAR; INTRAVENOUS; SUBCUTANEOUS
Refills: 0 | Status: DISCONTINUED | OUTPATIENT
Start: 2024-03-29 | End: 2024-03-29

## 2024-03-29 RX ORDER — DAPAGLIFLOZIN 10 MG/1
1 TABLET, FILM COATED ORAL
Refills: 0 | DISCHARGE

## 2024-03-29 RX ORDER — PROPRANOLOL HCL 160 MG
1 CAPSULE, EXTENDED RELEASE 24HR ORAL
Refills: 0 | DISCHARGE

## 2024-03-29 RX ORDER — METFORMIN HYDROCHLORIDE 850 MG/1
1 TABLET ORAL
Refills: 0 | DISCHARGE

## 2024-03-29 RX ORDER — FENTANYL CITRATE 50 UG/ML
50 INJECTION INTRAVENOUS
Refills: 0 | Status: DISCONTINUED | OUTPATIENT
Start: 2024-03-29 | End: 2024-03-29

## 2024-03-29 RX ORDER — ATORVASTATIN CALCIUM 80 MG/1
1 TABLET, FILM COATED ORAL
Refills: 0 | DISCHARGE

## 2024-03-29 RX ADMIN — SODIUM CHLORIDE 100 MILLILITER(S): 9 INJECTION, SOLUTION INTRAVENOUS at 11:51

## 2024-03-29 NOTE — ASU DISCHARGE PLAN (ADULT/PEDIATRIC) - NS MD DC FALL RISK RISK
For information on Fall & Injury Prevention, visit: [email or web address] OR  [email or web address] OR  https://www.cdc.gov/steadi/patient.html

## 2024-03-29 NOTE — ASU PREOP CHECKLIST - WEIGHT IN LBS
OB Progress Note:  PPD#2    S: 30yo GP PPD#2 s/p . Patient feels well. Pain is well controlled. She is tolerating a regular diet and passing flatus. She is voiding spontaneously, and ambulating without difficulty. She is breastfeeding. Denies CP/SOB. Denies lightheadedness/dizziness. Denies N/V.    O:  Vitals:  Vital Signs Last 24 Hrs  T(C): 36.8 (2018 05:29), Max: 36.8 (2018 05:29)  T(F): 98.3 (2018 05:29), Max: 98.3 (2018 05:29)  HR: 79 (2018 05:29) (79 - 105)  BP: 128/79 (2018 05:29) (128/79 - 137/81)  BP(mean): --  RR: 18 (2018 05:29) (18 - 18)  SpO2: 99% (2018 05:29) (99% - 100%)    MEDICATIONS  (STANDING):  acetaminophen   Tablet. 975 milliGRAM(s) Oral every 6 hours  diphtheria/tetanus/pertussis (acellular) Vaccine (ADAcel) 0.5 milliLiter(s) IntraMuscular once  ibuprofen  Tablet 600 milliGRAM(s) Oral every 6 hours  measles/mumps/rubella Vaccine 0.5 milliLiter(s) SubCutaneous once  oxyCODONE    IR 5 milliGRAM(s) Oral every 3 hours  prenatal multivitamin 1 Tablet(s) Oral daily  sodium chloride 0.9% lock flush 3 milliLiter(s) IV Push every 8 hours      Labs:  Blood type: A Negative  Rubella IgG: RPR: Negative                          11.8   12.55<H> >-----------< 141<L>    (  @ 21:00 )             34.8    18 @ 21:00      140  |  104  |  6<L>  ----------------------------<  88  3.6   |  19<L>  |  0.47<L>        Ca    9.4      2018 21:00    TPro  7.5  /  Alb  3.8  /  TBili  0.4  /  DBili  x   /  AST  19  /  ALT  20  /  AlkPhos  86  07-09-18 @ 21:00          Physical Exam:  General: NAD  Abdomen: soft, non-tender, non-distended, fundus firm  Vaginal: Lochia wnl  Extremities: No erythema/edema    A/P: 30yo PPD#2 s/p . Pregnancy complicated by PEC. HELLP labs wnl.  Patient doing well postpartum.  - Pain well controlled, continue current pain regimen  - Increase ambulation, SCDs when not ambulating  - Continue regular diet  - Discharge Planning    Teagan Campbell, PGY1 131.3

## 2024-03-29 NOTE — PRE-ANESTHESIA EVALUATION ADULT - NSANTHADDINFOFT_GEN_ALL_CORE
Risks/benefits of anesthesia discussed with patient at bedside.  All questions answered.  Patient in agreement with anesthesia plan.

## 2024-03-29 NOTE — ASU DISCHARGE PLAN (ADULT/PEDIATRIC) - NURSING INSTRUCTIONS
OK to take Tylenol/Acetaminophen 1000 mg  at 7:30 PM for pain and every 6 hours after as needed. OK to take Motrin/Ibuprofen 600 mg  3 hours after taking Tylenol  and every 6 hours after as needed.

## 2024-03-29 NOTE — ASU DISCHARGE PLAN (ADULT/PEDIATRIC) - CARE PROVIDER_API CALL
Geetha El [facility]  Otolaryngology  [address] [place] [address], [address]  [place] [address]  Phone: [phone]  Fax: [phone]  Scheduled Appointment: 04/05/2024 10:15 AM

## 2024-03-29 NOTE — ASU DISCHARGE PLAN (ADULT/PEDIATRIC) - ASU DC SPECIAL INSTRUCTIONSFT
No heavy lifting or strenuous activity for 2 weeks.  Leave tape in place, it wll be removed at your postoperative appointment.  You can resume normal bathing tomorrow and I encourage daily bathing.  No restriction on allowing clean water to run over the wound, please do not scrub at the wound.  You will be coming to the office for suture removal in approximately 1 week.  Take extra strength tylenol for pain or oxycodone if needed

## 2024-03-29 NOTE — ASU PATIENT PROFILE, ADULT - NSICDXPASTMEDICALHX_GEN_ALL_CORE_FT
PAST MEDICAL HISTORY:  Dermoid cyst of right orbit     Duane's syndrome, left eye     H/O hyperthyroidism     Hepatitis A---'from food' -- 1985     History of uterine fibroid     Hyperlipidemia     ovarian Cyst - left ovary     Palpitations     Thyrotoxicosis due to Graves' disease     Type 2 diabetes mellitus

## 2024-04-05 ENCOUNTER — APPOINTMENT (OUTPATIENT)
Dept: OTOLARYNGOLOGY | Facility: CLINIC | Age: 59
End: 2024-04-05
Payer: COMMERCIAL

## 2024-04-05 VITALS
HEART RATE: 70 BPM | SYSTOLIC BLOOD PRESSURE: 149 MMHG | HEIGHT: 64 IN | DIASTOLIC BLOOD PRESSURE: 89 MMHG | WEIGHT: 127 LBS | BODY MASS INDEX: 21.68 KG/M2

## 2024-04-05 PROCEDURE — 99024 POSTOP FOLLOW-UP VISIT: CPT

## 2024-04-05 RX ORDER — OXYCODONE 5 MG/1
5 TABLET ORAL
Qty: 6 | Refills: 0 | Status: COMPLETED | COMMUNITY
Start: 2024-03-22 | End: 2024-04-05

## 2024-04-05 NOTE — HISTORY OF PRESENT ILLNESS
[de-identified] : 59 year old female presents for post op visit s/p Excision of right orbital rim mass, with intermediate layered closure Appropriate post op pain, managed with tylenol Mild bruising around right eye, no swelling bleeding or signs of infection No blurry or double vision

## 2024-04-05 NOTE — ASSESSMENT
[FreeTextEntry1] : s/p excision right orbital rim mass.  Path c/w hemangioma.  Informed pt.   - healing beautifully  - keep out of the sun - keep covered with tape at least 1 week, ideally 3 weeks - retaped, pt given tape - f/up 3 weeks

## 2024-04-15 PROBLEM — E78.5 HYPERLIPIDEMIA, UNSPECIFIED: Chronic | Status: ACTIVE | Noted: 2024-03-08

## 2024-04-15 PROBLEM — R00.2 PALPITATIONS: Chronic | Status: ACTIVE | Noted: 2024-03-08

## 2024-04-15 PROBLEM — E11.9 TYPE 2 DIABETES MELLITUS WITHOUT COMPLICATIONS: Chronic | Status: ACTIVE | Noted: 2024-03-08

## 2024-04-15 PROBLEM — Z86.39 PERSONAL HISTORY OF OTHER ENDOCRINE, NUTRITIONAL AND METABOLIC DISEASE: Chronic | Status: ACTIVE | Noted: 2024-03-08

## 2024-04-15 PROBLEM — Z86.018 PERSONAL HISTORY OF OTHER BENIGN NEOPLASM: Chronic | Status: ACTIVE | Noted: 2024-03-08

## 2024-04-15 PROBLEM — E05.00 THYROTOXICOSIS WITH DIFFUSE GOITER WITHOUT THYROTOXIC CRISIS OR STORM: Chronic | Status: ACTIVE | Noted: 2024-03-08

## 2024-04-15 PROBLEM — D31.61: Chronic | Status: ACTIVE | Noted: 2024-03-08

## 2024-04-15 PROBLEM — H50.812 DUANE'S SYNDROME, LEFT EYE: Chronic | Status: ACTIVE | Noted: 2024-03-08

## 2024-04-17 NOTE — ED ADULT NURSE REASSESSMENT NOTE - NS ED NURSE REASSESS COMMENT FT1
[facility] . VSS. Patient left ED in stable condition Dm foot ulcer/ AKASH

## 2024-05-10 ENCOUNTER — APPOINTMENT (OUTPATIENT)
Dept: OTOLARYNGOLOGY | Facility: CLINIC | Age: 59
End: 2024-05-10

## 2024-05-22 ENCOUNTER — APPOINTMENT (OUTPATIENT)
Dept: ENDOCRINOLOGY | Facility: CLINIC | Age: 59
End: 2024-05-22
Payer: COMMERCIAL

## 2024-05-22 PROCEDURE — 99214 OFFICE O/P EST MOD 30 MIN: CPT

## 2024-05-22 RX ORDER — PROPRANOLOL HYDROCHLORIDE 10 MG/1
10 TABLET ORAL
Qty: 90 | Refills: 0 | Status: ACTIVE | COMMUNITY
Start: 2023-05-11 | End: 1900-01-01

## 2024-05-22 NOTE — ASSESSMENT
[FreeTextEntry1] : Thyrotoxicosis secondary to Graves' disease:  Patient's symptoms has minimally improved.  The reported TSH is still suppressed and free T4 levels are on the higher normal limit. Counseled extensively that patient should follow-up closely as methimazole can cause side effects of liver failure and neutropenia and therefore she needs blood work closely to monitor for the side effects and also to adjust the dose of methimazole based upon the TFTs Continue methimazole 10 mg daily Continue propranolol 10 mg daily as needed Check the TFTs now and advised patient to follow-up closely every 3 months.   Multiple thyroid nodules: Patient has multiple thyroid nodules status post FNA biopsy Will likely observe the nodule at this time, given patient still is hyperthyroid. Will follow up with thyroid ultrasound w/wo repeat FNA biopsy once the TFTs are within normal limit.  RTC in August 2024   Repeat TFTs on 15th June 2023

## 2024-05-22 NOTE — HISTORY OF PRESENT ILLNESS
[FreeTextEntry1] :  This visit was provided via telehealth using real-time 2-way audio visual technology. The patient was located at home at the time of the visit. The provider, NYA JENKINS, was located at the medical office located in [address] [place] [address] at the time of the visit. The patient and Provider participated in the telehealth encounter. Verbal consent given by the patient, self.  59-year-old Female with PMH of HLD, T2DM, hepatitis A, uterine leiomyoma s/p hysterectomy with ovarian removal due to ovarian cyst and fibroids, Duane's syndrome of left eye, Hyperthyroidism, hyperlipidemia, COVID infection is scheduled for follow up.  Hyperthyroidism Hx: Last year Patient developed neck swelling, tremors, voice hoarseness, intermittent palpitations, 22 lb weight loss. PCP did the Blood work in September 2022 and TFTs were mildly abnormal. Then she was admitted to [facility] for hyperthyroidism. Labs were remarkable for TSH <0.01, Total T3: 338 and Free T4: 5.1. TSI: 2.67, thyroglobulin: 247.0. She was discharged on Methimazole 20 mg daily and atenolol 25 mg daily on 25th Jan 2023.   Methimazole was initially reduced to 10 mg daily because of the generalized rash and improving free T4 levels. Patient has also seen an opthalmologist Dr. Chris Veliz and was told that her thyroid eye disease is stable.  Patient has lost to follow-up and now returning back in 1 year.  Patient is currently taking methimazole 5 mg twice daily.  She takes propranolol 10 mg as needed when she feels that she has palpitations and tremors, she has been doing her blood work with the family medicine doctor. She reports that blood work  in April 2024 was Free T4:1.1, Thyroid stimulating Hormone: <0.01, does not know whether she had elevated LFTs and low white count.  Patient also had a thyroid ultrasound last year in February 2023 that was significant for 4 nodules Nodule 1 is in the mid right lobe which is 2.5 x 2 cm x 1.8 cm, isoechoic, wider than tall and smooth with no calcification. Nodule 2 is located at the left lobe anteriorly which is 1.8 x 0.9 x 1.5 cm solid hypoechoic wider than tall with smooth margins. Nodule 3 is located on the upper pole of the left lobe measuring 1.4 x 1.6 x 1.6 cm. It is solid hyperechoic wider than tall and his mood borders. Nodule 4 is located on the left lower pole measuring 2.1 x 2.2 x 2.4 cm. It is solid hypoechoic wider than tall with macrocalcification.  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

## 2024-05-29 LAB
ALBUMIN SERPL ELPH-MCNC: 4.4 G/DL
ALP BLD-CCNC: 96 U/L
ALT SERPL-CCNC: 19 U/L
ANION GAP SERPL CALC-SCNC: 16 MMOL/L
AST SERPL-CCNC: 17 U/L
BASOPHILS # BLD AUTO: 0.03 K/UL
BASOPHILS NFR BLD AUTO: 0.3 %
BILIRUB SERPL-MCNC: 0.3 MG/DL
BUN SERPL-MCNC: 19 MG/DL
CALCIUM SERPL-MCNC: 9.4 MG/DL
CHLORIDE SERPL-SCNC: 101 MMOL/L
CO2 SERPL-SCNC: 23 MMOL/L
CREAT SERPL-MCNC: 0.57 MG/DL
EGFR: 105 ML/MIN/1.73M2
EOSINOPHIL # BLD AUTO: 0.05 K/UL
EOSINOPHIL NFR BLD AUTO: 0.6 %
GLUCOSE SERPL-MCNC: 179 MG/DL
HCT VFR BLD CALC: 43.2 %
HGB BLD-MCNC: 14.3 G/DL
IMM GRANULOCYTES NFR BLD AUTO: 0.3 %
LYMPHOCYTES # BLD AUTO: 2.74 K/UL
LYMPHOCYTES NFR BLD AUTO: 31.3 %
MAN DIFF?: NORMAL
MCHC RBC-ENTMCNC: 28.3 PG
MCHC RBC-ENTMCNC: 33.1 GM/DL
MCV RBC AUTO: 85.5 FL
MONOCYTES # BLD AUTO: 0.4 K/UL
MONOCYTES NFR BLD AUTO: 4.6 %
NEUTROPHILS # BLD AUTO: 5.5 K/UL
NEUTROPHILS NFR BLD AUTO: 62.9 %
PLATELET # BLD AUTO: 267 K/UL
POTASSIUM SERPL-SCNC: 4.4 MMOL/L
PROT SERPL-MCNC: 6.9 G/DL
RBC # BLD: 5.05 M/UL
RBC # FLD: 14 %
SODIUM SERPL-SCNC: 140 MMOL/L
T3 SERPL-MCNC: 97 NG/DL
T4 FREE SERPL-MCNC: 1.1 NG/DL
TSH SERPL-ACNC: 0.01 UIU/ML
WBC # FLD AUTO: 8.75 K/UL

## 2024-06-19 ENCOUNTER — APPOINTMENT (OUTPATIENT)
Dept: ENDOCRINOLOGY | Facility: CLINIC | Age: 59
End: 2024-06-19
Payer: COMMERCIAL

## 2024-06-19 DIAGNOSIS — E05.00 THYROTOXICOSIS WITH DIFFUSE GOITER W/OUT THYROTOXIC CRISIS OR STORM: ICD-10-CM

## 2024-06-19 DIAGNOSIS — E04.9 NONTOXIC GOITER, UNSPECIFIED: ICD-10-CM

## 2024-06-19 DIAGNOSIS — E04.2 NONTOXIC MULTINODULAR GOITER: ICD-10-CM

## 2024-06-19 PROCEDURE — 99214 OFFICE O/P EST MOD 30 MIN: CPT

## 2024-06-19 NOTE — HISTORY OF PRESENT ILLNESS
[FreeTextEntry1] :  This visit was provided via telehealth using real-time 2-way audio visual technology. The patient was located at home at the time of the visit. The provider, NYA JENKINS, was located at the medical office located in [address] [place] [address] at the time of the visit. The patient and Provider participated in the telehealth encounter. Verbal consent given by the patient, self.  59-year-old Female with PMH of HLD, T2DM, hepatitis A, uterine leiomyoma s/p hysterectomy with ovarian removal due to ovarian cyst and fibroids, Duane's syndrome of left eye, Hyperthyroidism, hyperlipidemia, COVID infection is scheduled for hypothyroidism follow up.  Hyperthyroidism Hx: 2 years ago patient developed neck swelling, tremors, voice hoarseness, intermittent palpitations, 22 lb weight loss. PCP did the Blood work in September 2022 and TFTs were mildly abnormal. Then she was admitted to [facility] for hyperthyroidism. Labs were remarkable for TSH <0.01, Total T3: 338 and Free T4: 5.1. TSI: 2.67, thyroglobulin: 247.0. She was discharged on Methimazole 20 mg daily and atenolol 25 mg daily on 25th Jan 2023.  Methimazole was initially reduced to 10 mg daily because of the generalized rash and improving free T4 levels. Patient has also seen an opthalmologist Dr. Chris Veliz and was told that her thyroid eye disease is stable.   Patient is currently taking methimazole 5 mg twice daily. She takes propranolol 10 mg as needed when she feels that she has palpitations and tremors. Blood work was done in May 2024 :Free T4:1.1, Thyroid stimulating Hormone: <0.01, LFTs and CBC profile were within normal limit  Patient also had a thyroid ultrasound  in February 2023 that was significant for 4 nodules Nodule 1 is in the mid right lobe which is 2.5 x 2 cm x 1.8 cm, isoechoic, wider than tall and smooth with no calcification. Nodule 2 is located at the left lobe anteriorly which is 1.8 x 0.9 x 1.5 cm solid hypoechoic wider than tall with smooth margins. Nodule 3 is located on the upper pole of the left lobe measuring 1.4 x 1.6 x 1.6 cm. It is solid hyperechoic wider than tall and his mood borders. Nodule 4 is located on the left lower pole measuring 2.1 x 2.2 x 2.4 cm. It is solid hypoechoic wider than tall with macrocalcification.  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 Patient did not repeat the blood work in June 2024, has a plan to do the blood work in the last week of June. Denies use of amiodarone and hx of radiation No family hx hyperthyroidism

## 2024-06-19 NOTE — ASSESSMENT
[FreeTextEntry1] : Patient's symptoms has minimally improved. The  TSH is still suppressed and free T4 levels are on the higher normal limit. Counseled extensively that patient should follow-up closely as methimazole can cause side effects of liver failure and neutropenia and therefore she needs blood work closely to monitor for the side effects and also to adjust the dose of methimazole based upon the TFTs Continue methimazole 10 mg daily Continue propranolol 10 mg daily as needed Check the TFTs now and advised patient to follow-up closely every 3 months.   Multiple thyroid nodules: Patient has multiple thyroid nodules status post FNA biopsy The FNA biopsy was indeterminate at that time. Thyroid ultrasound in office in July 2024 w/wo repeat FNA biopsy once the TFTs are within normal limit.  RTC in July 2024

## 2024-06-24 LAB
ALBUMIN SERPL ELPH-MCNC: 4.5 G/DL
ALP BLD-CCNC: 95 U/L
ALT SERPL-CCNC: 19 U/L
ANION GAP SERPL CALC-SCNC: 14 MMOL/L
AST SERPL-CCNC: 15 U/L
BASOPHILS # BLD AUTO: 0.04 K/UL
BASOPHILS NFR BLD AUTO: 0.5 %
BILIRUB SERPL-MCNC: 0.3 MG/DL
BUN SERPL-MCNC: 18 MG/DL
CALCIUM SERPL-MCNC: 9.6 MG/DL
CHLORIDE SERPL-SCNC: 101 MMOL/L
CO2 SERPL-SCNC: 23 MMOL/L
CREAT SERPL-MCNC: 0.64 MG/DL
EGFR: 102 ML/MIN/1.73M2
EOSINOPHIL # BLD AUTO: 0.04 K/UL
EOSINOPHIL NFR BLD AUTO: 0.5 %
GLUCOSE SERPL-MCNC: 175 MG/DL
HCT VFR BLD CALC: 43.5 %
HGB BLD-MCNC: 14.2 G/DL
IMM GRANULOCYTES NFR BLD AUTO: 0.5 %
INR PPP: 0.87 RATIO
LYMPHOCYTES # BLD AUTO: 2.56 K/UL
LYMPHOCYTES NFR BLD AUTO: 34 %
MAN DIFF?: NORMAL
MCHC RBC-ENTMCNC: 28.7 PG
MCHC RBC-ENTMCNC: 32.6 GM/DL
MCV RBC AUTO: 87.9 FL
MONOCYTES # BLD AUTO: 0.32 K/UL
MONOCYTES NFR BLD AUTO: 4.3 %
NEUTROPHILS # BLD AUTO: 4.52 K/UL
NEUTROPHILS NFR BLD AUTO: 60.2 %
PLATELET # BLD AUTO: 253 K/UL
POTASSIUM SERPL-SCNC: 4.4 MMOL/L
PROT SERPL-MCNC: 7.1 G/DL
PT BLD: 9.9 SEC
RBC # BLD: 4.95 M/UL
RBC # FLD: 14.2 %
SODIUM SERPL-SCNC: 139 MMOL/L
T4 FREE SERPL-MCNC: 0.9 NG/DL
TSH SERPL-ACNC: 2.17 UIU/ML
WBC # FLD AUTO: 7.52 K/UL

## 2024-06-24 RX ORDER — METHIMAZOLE 5 MG/1
5 TABLET ORAL
Qty: 30 | Refills: 0 | Status: ACTIVE | COMMUNITY
Start: 2023-02-12 | End: 1900-01-01

## 2024-07-17 ENCOUNTER — APPOINTMENT (OUTPATIENT)
Dept: ENDOCRINOLOGY | Facility: CLINIC | Age: 59
End: 2024-07-17

## 2024-08-12 ENCOUNTER — RX RENEWAL (OUTPATIENT)
Age: 59
End: 2024-08-12

## 2024-09-04 ENCOUNTER — APPOINTMENT (OUTPATIENT)
Dept: ULTRASOUND IMAGING | Facility: CLINIC | Age: 59
End: 2024-09-04
Payer: COMMERCIAL

## 2024-09-04 PROCEDURE — 76536 US EXAM OF HEAD AND NECK: CPT

## 2024-10-09 ENCOUNTER — APPOINTMENT (OUTPATIENT)
Dept: ENDOCRINOLOGY | Facility: CLINIC | Age: 59
End: 2024-10-09
Payer: COMMERCIAL

## 2024-10-09 VITALS
OXYGEN SATURATION: 97 % | SYSTOLIC BLOOD PRESSURE: 118 MMHG | DIASTOLIC BLOOD PRESSURE: 71 MMHG | HEIGHT: 64 IN | HEART RATE: 60 BPM | RESPIRATION RATE: 16 BRPM | TEMPERATURE: 97.8 F | WEIGHT: 137 LBS | BODY MASS INDEX: 23.39 KG/M2

## 2024-10-09 DIAGNOSIS — E04.2 NONTOXIC MULTINODULAR GOITER: ICD-10-CM

## 2024-10-09 DIAGNOSIS — E05.00 THYROTOXICOSIS WITH DIFFUSE GOITER W/OUT THYROTOXIC CRISIS OR STORM: ICD-10-CM

## 2024-10-09 DIAGNOSIS — E04.9 NONTOXIC GOITER, UNSPECIFIED: ICD-10-CM

## 2024-10-09 PROCEDURE — 99214 OFFICE O/P EST MOD 30 MIN: CPT

## 2024-10-09 PROCEDURE — G2211 COMPLEX E/M VISIT ADD ON: CPT | Mod: NC

## 2024-10-30 ENCOUNTER — APPOINTMENT (OUTPATIENT)
Dept: ENDOCRINOLOGY | Facility: CLINIC | Age: 59
End: 2024-10-30
Payer: COMMERCIAL

## 2024-10-30 ENCOUNTER — LABORATORY RESULT (OUTPATIENT)
Age: 59
End: 2024-10-30

## 2024-10-30 VITALS
HEART RATE: 56 BPM | TEMPERATURE: 96.5 F | OXYGEN SATURATION: 98 % | SYSTOLIC BLOOD PRESSURE: 126 MMHG | WEIGHT: 137 LBS | BODY MASS INDEX: 23.39 KG/M2 | DIASTOLIC BLOOD PRESSURE: 81 MMHG | HEIGHT: 64 IN | RESPIRATION RATE: 16 BRPM

## 2024-10-30 DIAGNOSIS — E04.9 NONTOXIC GOITER, UNSPECIFIED: ICD-10-CM

## 2024-10-30 DIAGNOSIS — E04.2 NONTOXIC MULTINODULAR GOITER: ICD-10-CM

## 2024-10-30 DIAGNOSIS — E05.00 THYROTOXICOSIS WITH DIFFUSE GOITER W/OUT THYROTOXIC CRISIS OR STORM: ICD-10-CM

## 2024-10-30 DIAGNOSIS — G25.2 OTHER SPECIFIED FORMS OF TREMOR: ICD-10-CM

## 2024-10-30 DIAGNOSIS — Z86.018 PERSONAL HISTORY OF OTHER BENIGN NEOPLASM: ICD-10-CM

## 2024-10-30 DIAGNOSIS — L72.0 EPIDERMAL CYST: ICD-10-CM

## 2024-10-30 DIAGNOSIS — Z86.39 PERSONAL HISTORY OF OTHER ENDOCRINE, NUTRITIONAL AND METABOLIC DISEASE: ICD-10-CM

## 2024-10-30 PROCEDURE — 10005 FNA BX W/US GDN 1ST LES: CPT

## 2024-11-04 ENCOUNTER — APPOINTMENT (OUTPATIENT)
Dept: ENDOCRINOLOGY | Facility: CLINIC | Age: 59
End: 2024-11-04
Payer: COMMERCIAL

## 2024-11-04 PROBLEM — L72.0 EPIDERMAL CYST OF FACE: Status: RESOLVED | Noted: 2024-01-24 | Resolved: 2024-11-04

## 2024-11-04 PROBLEM — G25.2 COARSE TREMORS: Status: RESOLVED | Noted: 2023-05-11 | Resolved: 2024-11-04

## 2024-11-04 PROBLEM — Z86.39 HISTORY OF TYPE 2 DIABETES MELLITUS: Status: RESOLVED | Noted: 2023-03-08 | Resolved: 2024-11-04

## 2024-11-04 PROBLEM — Z86.018: Status: RESOLVED | Noted: 2023-12-21 | Resolved: 2024-11-04

## 2024-11-04 PROCEDURE — 99441: CPT

## 2024-12-11 ENCOUNTER — RX RENEWAL (OUTPATIENT)
Age: 59
End: 2024-12-11

## 2025-01-21 ENCOUNTER — NON-APPOINTMENT (OUTPATIENT)
Age: 60
End: 2025-01-21

## 2025-02-11 ENCOUNTER — RX RENEWAL (OUTPATIENT)
Age: 60
End: 2025-02-11

## 2025-02-27 ENCOUNTER — RX RENEWAL (OUTPATIENT)
Age: 60
End: 2025-02-27

## 2025-04-04 NOTE — PATIENT PROFILE ADULT - NSPROPOAURINARYCATHETER_GEN_A_NUR
Rx Refill Note  Requested Prescriptions     Pending Prescriptions Disp Refills    metoprolol succinate XL (Toprol XL) 50 MG 24 hr tablet 60 tablet 3     Sig: Take 1 tablet by mouth Daily.        LAST OFFICE VISIT:  3/14/2025     NEXT OFFICE VISIT:  9/18/2025     Does the medication requests match the last office note:    [x] Yes   [] No    Does this refill request meet protocol details for MA to approve:     [x] Yes   [] No    SCRIPT UNDER GIANNA WAS SENT IN ON 03/14/2025.  REFILLING UNDER PANTA.   
no

## 2025-05-06 DIAGNOSIS — E04.2 NONTOXIC MULTINODULAR GOITER: ICD-10-CM

## 2025-05-28 ENCOUNTER — APPOINTMENT (OUTPATIENT)
Dept: ENDOCRINOLOGY | Facility: CLINIC | Age: 60
End: 2025-05-28
Payer: COMMERCIAL

## 2025-05-28 VITALS
BODY MASS INDEX: 22.66 KG/M2 | TEMPERATURE: 97.5 F | WEIGHT: 132 LBS | OXYGEN SATURATION: 98 % | HEART RATE: 54 BPM | DIASTOLIC BLOOD PRESSURE: 75 MMHG | SYSTOLIC BLOOD PRESSURE: 122 MMHG | RESPIRATION RATE: 16 BRPM

## 2025-05-28 DIAGNOSIS — E04.2 NONTOXIC MULTINODULAR GOITER: ICD-10-CM

## 2025-05-28 DIAGNOSIS — E05.00 THYROTOXICOSIS WITH DIFFUSE GOITER W/OUT THYROTOXIC CRISIS OR STORM: ICD-10-CM

## 2025-05-28 PROCEDURE — 99214 OFFICE O/P EST MOD 30 MIN: CPT

## 2025-05-28 PROCEDURE — G2211 COMPLEX E/M VISIT ADD ON: CPT | Mod: NC

## 2025-07-02 ENCOUNTER — APPOINTMENT (OUTPATIENT)
Dept: ENDOCRINOLOGY | Facility: CLINIC | Age: 60
End: 2025-07-02

## (undated) DEVICE — DRAPE TOWEL BLUE 17" X 24"

## (undated) DEVICE — SUT POLYSORB 4-0 18" P-12 UNDYED

## (undated) DEVICE — SPECIMEN CONTAINER 100ML

## (undated) DEVICE — DRSG MASTISOL

## (undated) DEVICE — PACK MINOR

## (undated) DEVICE — BLADE SCALPEL SAFETYLOCK #15

## (undated) DEVICE — DRSG STERISTRIPS 0.5 X 4"

## (undated) DEVICE — LAP PAD 18 X 18"

## (undated) DEVICE — PREP BETADINE SPONGE STICKS

## (undated) DEVICE — WARMING BLANKET LOWER ADULT

## (undated) DEVICE — SUT POLYSORB 3-0 30" V-20 UNDYED

## (undated) DEVICE — SUT SURGIPRO II 4-0 18" P-12

## (undated) DEVICE — DRAPE INSTRUMENT POUCH 6.75" X 11"

## (undated) DEVICE — SUT PLAIN GUT FAST ABSORBING 5-0 PC-1

## (undated) DEVICE — SOL IRR POUR NS 0.9% 500ML

## (undated) DEVICE — VISITEC 4X4

## (undated) DEVICE — SUT SURGIPRO II 5-0 18" P-13

## (undated) DEVICE — POSITIONER FOAM EGG CRATE ULNAR 2PCS (PINK)

## (undated) DEVICE — SUT SURGIPRO II 6-0 18" P-13

## (undated) DEVICE — DRAPE SPLIT SHEET 77" X 108"

## (undated) DEVICE — BLADE SCALPEL SAFETYLOCK #10

## (undated) DEVICE — SOL IRR POUR H2O 250ML

## (undated) DEVICE — BIPOLAR FORCEP KIRWAN JEWELERS STR 4" X 0.4MM W 12FT CORD (GREEN)

## (undated) DEVICE — TUBING SUCTION 20FT

## (undated) DEVICE — Device

## (undated) DEVICE — ELCTR BOVIE TIP BLADE INSULATED 2.75" EDGE

## (undated) DEVICE — VENODYNE/SCD SLEEVE CALF MEDIUM

## (undated) DEVICE — GLV 6.5 PROTEXIS (WHITE)

## (undated) DEVICE — MEDICATION LABELS W MARKER

## (undated) DEVICE — SUT MONOCRYL 5-0 18" P-3 UNDYED